# Patient Record
Sex: MALE | Race: WHITE | NOT HISPANIC OR LATINO | Employment: FULL TIME | URBAN - METROPOLITAN AREA
[De-identification: names, ages, dates, MRNs, and addresses within clinical notes are randomized per-mention and may not be internally consistent; named-entity substitution may affect disease eponyms.]

---

## 2017-04-23 DIAGNOSIS — I10 ESSENTIAL HYPERTENSION: ICD-10-CM

## 2017-04-23 RX ORDER — LISINOPRIL AND HYDROCHLOROTHIAZIDE 10; 12.5 MG/1; MG/1
TABLET ORAL
Qty: 90 TABLET | Refills: 0 | Status: SHIPPED | OUTPATIENT
Start: 2017-04-23 | End: 2017-07-10 | Stop reason: SDUPTHER

## 2017-07-10 DIAGNOSIS — I10 ESSENTIAL HYPERTENSION: ICD-10-CM

## 2017-07-10 RX ORDER — LISINOPRIL AND HYDROCHLOROTHIAZIDE 10; 12.5 MG/1; MG/1
TABLET ORAL
Qty: 90 TABLET | Refills: 0 | Status: SHIPPED | OUTPATIENT
Start: 2017-07-10 | End: 2017-09-11 | Stop reason: SDUPTHER

## 2017-09-11 ENCOUNTER — OFFICE VISIT (OUTPATIENT)
Dept: FAMILY MEDICINE | Facility: CLINIC | Age: 37
End: 2017-09-11
Payer: COMMERCIAL

## 2017-09-11 VITALS
DIASTOLIC BLOOD PRESSURE: 88 MMHG | BODY MASS INDEX: 30.29 KG/M2 | SYSTOLIC BLOOD PRESSURE: 138 MMHG | HEIGHT: 67 IN | WEIGHT: 193 LBS | HEART RATE: 90 BPM

## 2017-09-11 DIAGNOSIS — I10 ESSENTIAL HYPERTENSION: Primary | ICD-10-CM

## 2017-09-11 PROCEDURE — 3008F BODY MASS INDEX DOCD: CPT | Mod: ,,, | Performed by: INTERNAL MEDICINE

## 2017-09-11 PROCEDURE — 3077F SYST BP >= 140 MM HG: CPT | Mod: ,,, | Performed by: INTERNAL MEDICINE

## 2017-09-11 PROCEDURE — 3080F DIAST BP >= 90 MM HG: CPT | Mod: ,,, | Performed by: INTERNAL MEDICINE

## 2017-09-11 PROCEDURE — 99203 OFFICE O/P NEW LOW 30 MIN: CPT | Mod: ,,, | Performed by: INTERNAL MEDICINE

## 2017-09-11 RX ORDER — LISINOPRIL AND HYDROCHLOROTHIAZIDE 10; 12.5 MG/1; MG/1
1 TABLET ORAL DAILY
Qty: 90 TABLET | Refills: 1 | Status: SHIPPED | OUTPATIENT
Start: 2017-09-11 | End: 2017-12-13 | Stop reason: SDUPTHER

## 2017-09-11 NOTE — PATIENT INSTRUCTIONS
Taking Your Blood Pressure  Blood pressure is the force of blood against the artery wall as it moves from the heart through the blood vessels. You can take your own blood pressure reading using a digital monitor. Take your readings the same each time, using the same arm. Take readings as often as your healthcare provider instructs.  About blood pressure monitors  Blood pressure monitors are designed for certain ages and cases. You can find monitors for older adults, for pregnant women, and for children. Make sure the one you choose is the right one for your age and situation.  The American Heart Association recommends an automatic cuff monitor that fits on your upper arm (bicep). The cuff should fit your arm size. A cuff thats too large or too small will not give an accurate reading. Measure around your upper arm to find your size.  Monitors that attach to your finger or wrist are not as accurate as monitors for your upper arm.  Ask your healthcare provider for help in choosing a monitor. Bring your monitor to your next provider visit if you need help in using it the correct way.  The steps below are general instructions for using an automatic digital monitor.  Step 1. Relax    · Take your blood pressure at the same time every day, such as in the morning or evening, or at the time your healthcare provider recommends.  · Wait at least a half-hour after smoking, eating, or exercising. Don't drink coffee, tea, soda, or other caffeinated beverages before checking your blood pressure.  · Sit comfortably at a table with both feet on the floor. Do not cross your legs or feet. Place the monitor near you.  · Rest for a few minutes before you begin.  Step 2. Wrap the cuff    · Place your arm on the table, palm up. Your arm should be at the level of your heart. Wrap the cuff around your upper arm, just above your elbow. Its best done on bare skin, not over clothing. Most cuffs will indicate where the brachial artery (the  blood vessel in the middle of the arm at the inner side of the elbow) should line up with the cuff. Look in your monitor's instruction booklet for an illustration. You can also bring your cuff to your healthcare provider and have them show you how to correctly place the cuff.  Step 3. Inflate the cuff    · Push the button that starts the pump.  · The cuff will tighten, then loosen.  · The numbers will change. When they stop changing, your blood pressure reading will appear.  · Take 2 or 3 readings one minute apart.  Step 4. Write down the results of each reading    · Write down your blood pressure numbers for each reading. Note the date and time. Keep your results in one place, such as a notebook. Even if your monitor has a built-in memory, keep a hard copy of the readings.  · Remove the cuff from your arm. Turn off the machine.  · Bring your blood pressure records with your healthcare providers at each visit.  · If you start a new blood pressure medicine, note the day you started the new medicine. Also note the day if you change the dose of your medicine. This information goes on your blood pressure recording sheet. This will help your healthcare provider monitor how well the medicine changes are working.  · Ask your healthcare provider what numbers should prompt you to call him or her. Also ask what numbers should prompt you to get help right away.  Date Last Reviewed: 11/1/2016  © 1322-2670 [employer]. [address] [place] [address], [place] [address]. All rights reserved. This information is not intended as a substitute for professional medical care. Always follow your healthcare professional's instructions.

## 2017-09-11 NOTE — PROGRESS NOTES
Subjective:       Patient ID: Sanchez De La Garza is a 36 y.o. male.    Chief Complaint: Establish Care and Hypertension    This is patient's first visit to establish with me as a primary care physician. He is to follow-up with the [facility] but somehow this seems to be some insurance conflict.    He has hypertension cyst several years. Previously he used to be on lisinopril but secondary to uncontrolled blood pressure it was changed to listen up with hydrochlorothiazide.    Patient also has mild anemia suspected secondary to thalassemia trait.    He does not smoke cigarettes. No illicit drug use. He does drink up to 4 beers on a given weekend.    Exercises modest with some weight and strength.    Sleep is okay. He does get anxious sometimes which is probably related to job and mild baseline general anxiety.    Apparently he was diagnosed with ADD-Patient does not believe that he has ADD. Patient states that he is able to focus at his job. He had a GPA of 3.5 without any medications.      Hypertension   This is a chronic problem. The current episode started more than 1 year ago. The problem has been waxing and waning since onset. Associated symptoms include anxiety. Pertinent negatives include no chest pain, malaise/fatigue, palpitations, peripheral edema, PND or shortness of breath. There are no associated agents to hypertension. Risk factors for coronary artery disease include male gender, obesity and dyslipidemia. Past treatments include ACE inhibitors and diuretics. There is no history of angina, CVA, heart failure, PVD or retinopathy. There is no history of chronic renal disease, coarctation of the aorta or a hypertension causing med.       Past Medical History:   Diagnosis Date    Anxiety     Hypertension      Social History     Social History    Marital status:      Spouse name: N/A    Number of children: N/A    Years of education: N/A     Occupational History    IT support      IT support  "    Social History Main Topics    Smoking status: Former Smoker    Smokeless tobacco: Never Used    Alcohol use Yes    Drug use: No    Sexual activity: Yes     Other Topics Concern    Not on file     Social History Narrative    No narrative on file     History reviewed. No pertinent surgical history.  Family History   Problem Relation Age of Onset    Alcohol abuse Father     Cancer Father        Review of Systems   Constitutional: Negative for activity change, appetite change, fatigue, malaise/fatigue and unexpected weight change.   HENT: Negative for congestion, sneezing and trouble swallowing.    Eyes: Negative for pain and visual disturbance.   Respiratory: Negative for cough, chest tightness and shortness of breath.    Cardiovascular: Negative for chest pain, palpitations, leg swelling and PND.        HTN     Gastrointestinal: Negative for abdominal distention, abdominal pain, blood in stool, constipation and diarrhea.   Endocrine: Negative for cold intolerance, heat intolerance, polydipsia, polyphagia and polyuria.   Genitourinary: Negative for dysuria, hematuria and scrotal swelling.   Musculoskeletal: Negative for arthralgias, back pain and gait problem.   Skin: Negative for pallor, rash and wound.   Allergic/Immunologic: Negative for environmental allergies, food allergies and immunocompromised state.   Neurological: Negative for dizziness, seizures, speech difficulty, light-headedness and numbness.   Hematological: Negative for adenopathy. Does not bruise/bleed easily.        Patient has microcytic anemia which is chronic. He is to assume to have minor thalassemia trait. I do not see any confirmatory tests as yet.   Psychiatric/Behavioral: Negative for agitation, behavioral problems and confusion. The patient is nervous/anxious.        Objective:       Vitals:    09/11/17 1422 09/11/17 1450   BP: (!) 143/93 138/88   Pulse: 90    Weight: 87.5 kg (193 lb)    Height: 5' 7" (1.702 m)      Physical Exam "   Constitutional: He is oriented to person, place, and time. He appears well-developed.   HENT:   Head: Normocephalic and atraumatic.   Nose: Nose normal.   Mouth/Throat: Oropharynx is clear and moist. No oropharyngeal exudate.   Eyes: Conjunctivae and EOM are normal.   Neck: Normal range of motion. Neck supple. No JVD present. No tracheal deviation present. No thyromegaly present.   Cardiovascular: Normal rate, regular rhythm and normal heart sounds.  Exam reveals no gallop and no friction rub.    No murmur heard.  Pulmonary/Chest: Effort normal and breath sounds normal. No respiratory distress. He has no wheezes. He has no rales.   Abdominal: Soft. Bowel sounds are normal. He exhibits no distension. There is no tenderness.   Musculoskeletal: Normal range of motion.   Neurological: He is alert and oriented to person, place, and time. He has normal reflexes.   Skin: Skin is warm and dry.   Psychiatric: He has a normal mood and affect.   Nursing note and vitals reviewed.      Assessment:       1. Essential hypertension         Plan:           Essential hypertension  -     lisinopril-hydrochlorothiazide (PRINZIDE,ZESTORETIC) 10-12.5 mg per tablet; Take 1 tablet by mouth once daily.  Dispense: 90 tablet; Refill: 1    Patient blood pressures have been reviewed and they're borderline normal. His medications will be refilled. He'll be advised to continue to watch his diet and exercise. Cut down on the fried and fatty food and salty food.  More greens and vegetables.    I will like to see him back in about 3 months time and review his blood pressures again. At that time I will check a chemistry and a lipid panel which will be due given that the labs were done 1 year before.    I agree with Patient assumption that he probably does not have ADD. He probably has mild baseline anxiety and have encouraged him to continue with meditation, self realization, yoga and breathing.    I did discuss about alcohol consumption and any  consumption of more than 2 beers on a given day would be a cause of concern especially given hypertension and being on medications. Patient verbalizes understanding.    I set a goal for him to lose 5 pounds of weight before the next visit so that his BMI comes below 30.

## 2017-12-08 LAB
ALBUMIN SERPL-MCNC: 4.9 G/DL (ref 3.5–5.5)
ALBUMIN/CREAT UR: <8.8 MG/G CREAT (ref 0–30)
ALBUMIN/GLOB SERPL: 1.8 {RATIO} (ref 1.2–2.2)
ALP SERPL-CCNC: 52 IU/L (ref 39–117)
ALT SERPL-CCNC: 34 IU/L (ref 0–44)
AST SERPL-CCNC: 23 IU/L (ref 0–40)
BILIRUB SERPL-MCNC: 0.4 MG/DL (ref 0–1.2)
BUN SERPL-MCNC: 15 MG/DL (ref 6–20)
BUN/CREAT SERPL: 17 (ref 9–20)
CALCIUM SERPL-MCNC: 9.4 MG/DL (ref 8.7–10.2)
CHLORIDE SERPL-SCNC: 97 MMOL/L (ref 96–106)
CHOLEST SERPL-MCNC: 172 MG/DL (ref 100–199)
CO2 SERPL-SCNC: 26 MMOL/L (ref 18–29)
CREAT SERPL-MCNC: 0.89 MG/DL (ref 0.76–1.27)
CREAT UR-MCNC: 34 MG/DL
GFR SERPLBLD CREATININE-BSD FMLA CKD-EPI: 112 ML/MIN/1.73
GFR SERPLBLD CREATININE-BSD FMLA CKD-EPI: 129 ML/MIN/1.73
GLOBULIN SER CALC-MCNC: 2.8 G/DL (ref 1.5–4.5)
GLUCOSE SERPL-MCNC: 94 MG/DL (ref 65–99)
HDLC SERPL-MCNC: 45 MG/DL
LDLC SERPL CALC-MCNC: 107 MG/DL (ref 0–99)
MICROALBUMIN UR-MCNC: <3 UG/ML
POTASSIUM SERPL-SCNC: 4.2 MMOL/L (ref 3.5–5.2)
PROT SERPL-MCNC: 7.7 G/DL (ref 6–8.5)
SODIUM SERPL-SCNC: 139 MMOL/L (ref 134–144)
TRIGL SERPL-MCNC: 101 MG/DL (ref 0–149)
VLDLC SERPL CALC-MCNC: 20 MG/DL (ref 5–40)

## 2017-12-13 ENCOUNTER — OFFICE VISIT (OUTPATIENT)
Dept: FAMILY MEDICINE | Facility: CLINIC | Age: 37
End: 2017-12-13
Payer: COMMERCIAL

## 2017-12-13 VITALS
HEIGHT: 67 IN | SYSTOLIC BLOOD PRESSURE: 135 MMHG | HEART RATE: 82 BPM | WEIGHT: 188 LBS | BODY MASS INDEX: 29.51 KG/M2 | DIASTOLIC BLOOD PRESSURE: 84 MMHG

## 2017-12-13 DIAGNOSIS — I10 ESSENTIAL HYPERTENSION: Primary | ICD-10-CM

## 2017-12-13 PROCEDURE — 99213 OFFICE O/P EST LOW 20 MIN: CPT | Mod: ,,, | Performed by: INTERNAL MEDICINE

## 2017-12-13 RX ORDER — LISINOPRIL AND HYDROCHLOROTHIAZIDE 10; 12.5 MG/1; MG/1
1 TABLET ORAL DAILY
Qty: 90 TABLET | Refills: 3 | Status: SHIPPED | OUTPATIENT
Start: 2017-12-13 | End: 2018-06-05 | Stop reason: SDUPTHER

## 2017-12-13 NOTE — PROGRESS NOTES
Subjective:       Patient ID: Sanchez De La Garza is a 36 y.o. male.    Chief Complaint: Hypertension (lab review)    Mr. Sanchez De La Garza is a 36-year-old  male who comes for follow-up. He has underlying hypertension. He is currently taking lisinopril hydrochlorothiazide. He has managed to lose 4-5 pounds of weight after watching his diet and exercise.    He reports no side effects from this medication. He has no chest pains, shortness of breath, cough or expectoration. Patient is nonsmoker. He works for IT industry as a .      Hypertension   This is a chronic problem. The current episode started more than 1 year ago. The problem has been gradually improving since onset. Pertinent negatives include no anxiety, chest pain, malaise/fatigue, palpitations or shortness of breath. Past treatments include ACE inhibitors, diuretics and lifestyle changes. There is no history of a hypertension causing med or pheochromocytoma.       Past Medical History:   Diagnosis Date    Anxiety     Hypertension      Social History     Social History    Marital status:      Spouse name: N/A    Number of children: N/A    Years of education: N/A     Occupational History    IT support      IT support     Social History Main Topics    Smoking status: Former Smoker    Smokeless tobacco: Never Used    Alcohol use Yes    Drug use: No    Sexual activity: Yes     Other Topics Concern    Not on file     Social History Narrative    No narrative on file     History reviewed. No pertinent surgical history.  Family History   Problem Relation Age of Onset    Alcohol abuse Father     Cancer Father        Review of Systems   Constitutional: Negative for activity change, appetite change, fatigue, malaise/fatigue and unexpected weight change.   Eyes: Negative for pain and visual disturbance.   Respiratory: Negative for cough, chest tightness and shortness of breath.    Cardiovascular: Negative for chest pain, palpitations and  "leg swelling.        HTN     Endocrine: Negative for cold intolerance, heat intolerance, polydipsia, polyphagia and polyuria.   Musculoskeletal: Negative for arthralgias, back pain and gait problem.   Skin: Negative for pallor, rash and wound.   Allergic/Immunologic: Negative for environmental allergies, food allergies and immunocompromised state.   Hematological: Negative for adenopathy. Does not bruise/bleed easily.        Patient has microcytic anemia which is chronic. He is to assume to have minor thalassemia trait. I do not see any confirmatory tests as yet.   Psychiatric/Behavioral: Negative for agitation, behavioral problems and confusion. The patient is not nervous/anxious.         Patient is more calm and relaxed as compared to last time.       Objective:       Vitals:    12/13/17 0941   BP: 135/84   Pulse: 82   Weight: 85.3 kg (188 lb)   Height: 5' 7" (1.702 m)     Physical Exam   Constitutional: He is oriented to person, place, and time. He appears well-developed.   HENT:   Head: Normocephalic and atraumatic.   Nose: Nose normal.   Mouth/Throat: Oropharynx is clear and moist. No oropharyngeal exudate.   Eyes: Conjunctivae and EOM are normal.   Neck: Normal range of motion. Neck supple. No JVD present. No tracheal deviation present. No thyromegaly present.   Cardiovascular: Normal rate, regular rhythm and normal heart sounds.  Exam reveals no gallop and no friction rub.    No murmur heard.  Pulmonary/Chest: Effort normal and breath sounds normal. No respiratory distress. He has no wheezes. He has no rales.   Abdominal: Soft. Bowel sounds are normal. He exhibits no distension. There is no tenderness.   Musculoskeletal: Normal range of motion.   Neurological: He is alert and oriented to person, place, and time. He has normal reflexes.   Skin: Skin is warm and dry.   Psychiatric: He has a normal mood and affect.   Nursing note and vitals reviewed.      Assessment:       1. Essential hypertension       "   Glucose 65 - 99 mg/dL 94    BUN, Bld 6 - 20 mg/dL 15    Creatinine 0.76 - 1.27 mg/dL 0.89    eGFR if non African American >59 mL/min/1.73 112    eGFR if African American >59 mL/min/1.73 129    BUN/Creatinine Ratio 9 - 20 17    Sodium 134 - 144 mmol/L 139    Potassium 3.5 - 5.2 mmol/L 4.2    Chloride 96 - 106 mmol/L 97    CO2 18 - 29 mmol/L 26    Calcium 8.7 - 10.2 mg/dL 9.4      Cholesterol 100 - 199 mg/dL 172    Triglycerides 0 - 149 mg/dL 101    HDL >39 mg/dL 45    VLDL Cholesterol Yassine 5 - 40 mg/dL 20    LDL Calculated 0 - 99 mg/dL 107       Creatinine, Random Ur Not Estab. mg/dL 34.0    Microalb, Ur Not Estab. ug/mL <3.0    Comments: **Verified by repeat analysis**   Microalb/Crt. Ratio 0.0 - 30.0 mg/g creat <8.8    Resulting Agency  [facility]         Plan:           Essential hypertension  -     lisinopril-hydrochlorothiazide (PRINZIDE,ZESTORETIC) 10-12.5 mg per tablet; Take 1 tablet by mouth once daily.  Dispense: 90 tablet; Refill: 3    I've reviewed patient's blood pressures at home. Most of the time they seem to be in 130s and 110s. On one occasion it was 142/96 but that was an exception rather than the rule.    Patient's blood pressure medications have been reviewed. His labs have been reviewed including chemistry as well as lipid panel. His LDL cholesterol is 107 which is slightly elevated.    His general chemistry including blood glucose and kidney tests are normal. Urine microalbumin is negative.      Patient has managed to lose weight and the stent watch his diet.      If everything goes on well, I will see patient in approximately 6 months time for annual physical and take it from there.      He will continue to watch his diet and incorporate exercise regimen and try to perhaps lose another 3 or 4 pounds by the next time.

## 2017-12-13 NOTE — PATIENT INSTRUCTIONS
Taking Your Blood Pressure  Blood pressure is the force of blood against the artery wall as it moves from the heart through the blood vessels. You can take your own blood pressure reading using a digital monitor. Take your readings the same each time, using the same arm. Take readings as often as your healthcare provider instructs.  About blood pressure monitors  Blood pressure monitors are designed for certain ages and cases. You can find monitors for older adults, for pregnant women, and for children. Make sure the one you choose is the right one for your age and situation.  The American Heart Association recommends an automatic cuff monitor that fits on your upper arm (bicep). The cuff should fit your arm size. A cuff thats too large or too small will not give an accurate reading. Measure around your upper arm to find your size.  Monitors that attach to your finger or wrist are not as accurate as monitors for your upper arm.  Ask your healthcare provider for help in choosing a monitor. Bring your monitor to your next provider visit if you need help in using it the correct way.  The steps below are general instructions for using an automatic digital monitor.  Step 1. Relax    · Take your blood pressure at the same time every day, such as in the morning or evening, or at the time your healthcare provider recommends.  · Wait at least a half-hour after smoking, eating, or exercising. Don't drink coffee, tea, soda, or other caffeinated beverages before checking your blood pressure.  · Sit comfortably at a table with both feet on the floor. Do not cross your legs or feet. Place the monitor near you.  · Rest for a few minutes before you begin.  Step 2. Wrap the cuff    · Place your arm on the table, palm up. Your arm should be at the level of your heart. Wrap the cuff around your upper arm, just above your elbow. Its best done on bare skin, not over clothing. Most cuffs will indicate where the brachial artery (the  blood vessel in the middle of the arm at the inner side of the elbow) should line up with the cuff. Look in your monitor's instruction booklet for an illustration. You can also bring your cuff to your healthcare provider and have them show you how to correctly place the cuff.  Step 3. Inflate the cuff    · Push the button that starts the pump.  · The cuff will tighten, then loosen.  · The numbers will change. When they stop changing, your blood pressure reading will appear.  · Take 2 or 3 readings one minute apart.  Step 4. Write down the results of each reading    · Write down your blood pressure numbers for each reading. Note the date and time. Keep your results in one place, such as a notebook. Even if your monitor has a built-in memory, keep a hard copy of the readings.  · Remove the cuff from your arm. Turn off the machine.  · Bring your blood pressure records with your healthcare providers at each visit.  · If you start a new blood pressure medicine, note the day you started the new medicine. Also note the day if you change the dose of your medicine. This information goes on your blood pressure recording sheet. This will help your healthcare provider monitor how well the medicine changes are working.  · Ask your healthcare provider what numbers should prompt you to call him or her. Also ask what numbers should prompt you to get help right away.  Date Last Reviewed: 11/1/2016  © 0538-9498 [employer]. [address] [place] [address], [place] [address]. All rights reserved. This information is not intended as a substitute for professional medical care. Always follow your healthcare professional's instructions.

## 2018-06-05 DIAGNOSIS — I10 ESSENTIAL HYPERTENSION: ICD-10-CM

## 2018-06-05 RX ORDER — LISINOPRIL AND HYDROCHLOROTHIAZIDE 10; 12.5 MG/1; MG/1
1 TABLET ORAL DAILY
Qty: 90 TABLET | Refills: 1 | Status: SHIPPED | OUTPATIENT
Start: 2018-06-05 | End: 2019-03-14 | Stop reason: SDUPTHER

## 2018-06-11 ENCOUNTER — OFFICE VISIT (OUTPATIENT)
Dept: FAMILY MEDICINE | Facility: CLINIC | Age: 38
End: 2018-06-11
Payer: COMMERCIAL

## 2018-06-11 VITALS
BODY MASS INDEX: 29.51 KG/M2 | DIASTOLIC BLOOD PRESSURE: 81 MMHG | SYSTOLIC BLOOD PRESSURE: 123 MMHG | HEIGHT: 67 IN | WEIGHT: 188 LBS | HEART RATE: 78 BPM

## 2018-06-11 DIAGNOSIS — Z00.00 ANNUAL PHYSICAL EXAM: Primary | ICD-10-CM

## 2018-06-11 PROCEDURE — 99395 PREV VISIT EST AGE 18-39: CPT | Mod: ,,, | Performed by: INTERNAL MEDICINE

## 2018-06-11 NOTE — PROGRESS NOTES
Subjective:       Patient ID: Sanchez De La Garza is a 37 y.o. male.    Chief Complaint: Annual Exam (well check )    Mr. Sanchez De La Garza is a pleasant 37-year-old  male who comes for annual physical. Thus far his diagnoses include hypertension which is stable on medications. He also has a diagnosis of alpha thalassemia which does not cause any problems.    Patient has a past history of smoking perhaps no more than 2 or 3 packs over 10 years. He quit smoking thereafter. He does not abuse alcohol. No illegal substance abuse.      Jogging and bicycling are formal physical exercises. He also swims in his pool.    Several years ago her hips because of some mild anxiety, he was labeled as having ADD/ADHD though he was never formally prescribed any medications.    At this point and station of his life, he does not exhibit any symptoms suggestive of ADD or ADHD. He is able to focus and complete his tasks and chores. He does get mildly anxious  sometimes but it is totally controllable. He is not under any medications. Sometimes he has to travel and he is worried about jet lag and inability to sleep.  He is happily  and is blessed with 2 children. He works as a independent  and mostly works out of home. He is stable and happy with his job.    He gets regular dental care. He gets regular eye checkup. He is a safe . He does use sunscreen when he goes out in harsh sun.        Past Medical History:   Diagnosis Date    Anemia     Alpha Thallsemia    Anxiety     Mild anxiety    Hypertension      Social History     Social History    Marital status:      Spouse name: N/A    Number of children: 2    Years of education: N/A     Occupational History    IT support Independant      IT support     Social History Main Topics    Smoking status: Former Smoker     Packs/day: 0.10     Types: Cigarettes     Start date: 1/1/2004     Quit date: 5/1/2016    Smokeless tobacco: Never Used    Alcohol use  Yes    Drug use: No    Sexual activity: Yes     Partners: Female     Other Topics Concern    Not on file     Social History Narrative    Nina was raised by His step father. Biological father  at age 52        B - 5 G 3        Average 1-2 cigarettes per day for 10 years     History reviewed. No pertinent surgical history.  Family History   Problem Relation Age of Onset    Alcohol abuse Father     Cancer Father     No Known Problems Sister     Mental illness Brother         Drug and alcohol issues       Review of Systems   Constitutional: Negative for activity change, appetite change, fatigue and unexpected weight change.   HENT: Negative for congestion, drooling, facial swelling and postnasal drip.    Eyes: Negative for pain and visual disturbance.   Respiratory: Negative for cough, chest tightness and shortness of breath.    Cardiovascular: Negative for chest pain, palpitations and leg swelling.        HTN     Gastrointestinal: Negative for abdominal distention, anal bleeding, constipation and diarrhea.   Endocrine: Negative for cold intolerance, heat intolerance, polydipsia, polyphagia and polyuria.   Genitourinary: Negative for difficulty urinating, discharge, flank pain and genital sores.   Musculoskeletal: Negative for arthralgias, back pain and gait problem.   Skin: Negative for pallor, rash and wound.   Allergic/Immunologic: Negative for environmental allergies, food allergies and immunocompromised state.   Neurological: Negative for syncope, light-headedness and headaches.   Hematological: Negative for adenopathy. Does not bruise/bleed easily.        Patient has microcytic anemia which is chronic. He is to assume to have minor thalassemia trait. I do not see any confirmatory tests as yet.   Psychiatric/Behavioral: Negative for agitation, behavioral problems and confusion. The patient is not nervous/anxious.         Patient is more calm and relaxed as compared to last time. Remote history of  "anxiety and suspected ADD       Objective:       Vitals:    06/11/18 1059   BP: 123/81   Pulse: 78   Weight: 85.3 kg (188 lb)   Height: 5' 7" (1.702 m)     Physical Exam   Constitutional: He is oriented to person, place, and time. He appears well-developed.   HENT:   Head: Normocephalic and atraumatic.   Nose: Nose normal.   Mouth/Throat: Oropharynx is clear and moist. No oropharyngeal exudate.   Eyes: Conjunctivae and EOM are normal.   Neck: Normal range of motion. Neck supple. No JVD present. No tracheal deviation present. No thyromegaly present.   Cardiovascular: Normal rate, regular rhythm and normal heart sounds.  Exam reveals no gallop and no friction rub.    No murmur heard.  Pulmonary/Chest: Effort normal and breath sounds normal. No respiratory distress. He has no wheezes. He has no rales.   Abdominal: Soft. Bowel sounds are normal. He exhibits no distension. There is no tenderness. There is no guarding.   Musculoskeletal: Normal range of motion. He exhibits no edema, tenderness or deformity.   Neurological: He is alert and oriented to person, place, and time. He has normal reflexes.   Skin: Skin is warm and dry.   Psychiatric: He has a normal mood and affect.   Nursing note and vitals reviewed.    Lipid panel   Order: [id number]   Status:  Final result   Visible to patient:  Yes (Patient Portal) Next appt:  06/11/2018 at 11:00 AM in Family Medicine (Beltran Munoz MD) Dx:  Essential hypertension     Ref Range & Units 6mo ago 1yr ago 3yr ago    Cholesterol 100 - 199 mg/dL 172  186R, CM  189R, CM     Triglycerides 0 - 149 mg/dL 101  120R, CM  65R, CM     HDL >39 mg/dL 45  52R, CM  59R, CM     VLDL Cholesterol Yassine 5 - 40 mg/dL 20       LDL Calculated 0 - 99 mg/dL 107                Assessment:       1. Annual physical exam         Plan:           Annual physical exam    Patient is generally updated on most preventive care issues. Also mentioned is about pneumonia vaccine which I'll check into. There is a remote " history of smoking.    Advised Mr. De La Garza about age and season appropriate immunizations/ cancer screenings.  Also seasonal influenza vaccine, update on tetanus diphtheria vaccination every 10 years.    Patient's BMI is 29 and have advised him some weight loss. 5-10 pounds.    Follow-up in 6 months to review blood pressure.

## 2018-06-11 NOTE — PATIENT INSTRUCTIONS
Exercise for a Healthier Heart  You may wonder how you can improve the health of your heart. If youre thinking about exercise, youre on the right track. You dont need to become an athlete, but you do need a certain amount of brisk exercise to help strengthen your heart. If you have been diagnosed with a heart condition, your doctor may recommend exercise to help stabilize your condition. To help make exercise a habit, choose safe, fun activities.     Exercise with a friend. When activity is fun, you're more likely to stick with it.     Be sure to check with your healthcare provider before starting an exercise program.   Why exercise?  Exercising regularly offers many healthy rewards. It can help you do all of the following:  · Improve your blood cholesterol level to help prevent further heart trouble  · Lower your blood pressure to help prevent a stroke or heart attack  · Control diabetes, or reduce your risk of getting this disease  · Improve your heart and lung function  · Reach and maintain a healthy weight  · Make your muscles stronger and more limber so you can stay active  · Prevent falls and fractures by slowing the loss of bone mass (osteoporosis)  · Manage stress better  · Reduce your blood pressure  · Improve your sense of self and your body image  Exercise tips  Ease into your routine. Set small goals. Then build on them.  Exercise on most days. Aim for a total of 150 or more minutes of moderate to  vigorous intensity activity each week. Consider 40 minutes, 3 to 4 times a week. For best results, activity should last for 40 minutes on average. It is OK to work up to the 40 minute period over time. Examples of moderate-intensity activity is walking 1 mile in 15 minutes or 30 to 45 minutes of yard work.  Step up your daily activity level. Along with your exercise program, try being more active throughout the day. Walk instead of drive. Do more household tasks or yard work.  Choose one or more  activities you enjoy. Walking is one of the easiest things you can do. You can also try swimming, riding a bike, dancing, or taking an exercise class.  Stop exercising and call your doctor if you:  · Have chest pain or feel dizzy or lightheaded  · Feel burning, tightness, pressure, or heaviness in your chest, neck, shoulders, back, or arms  · Have unusual shortness of breath  · Have increased joint or muscle pain  · Have palpitations or an irregular heartbeat   Date Last Reviewed: 5/1/2016 © 2000-2017 [employer]. [address] [place] [address]. All rights reserved. This information is not intended as a substitute for professional medical care. Always follow your healthcare professional's instructions.        Prevention Guidelines, Men Ages 18 to 39  Screening tests and vaccines are an important part of managing your health. Health counseling is essential, too. Below are guidelines for these, for men ages 18 to 39. Talk with your healthcare provider to make sure youre up-to-date on what you need.  Screening Who needs it How often   Alcohol misuse All men in this age group At routine exams   Blood pressure All men in this age group Every 2 years if your blood pressure is less than 120/80 mm Hg; yearly if your systolic blood pressure is 120 to 139 mm Hg, or your diastolic blood pressure reading is 80 to 89 mm Hg   Depression All men in this age group At routine exams   Diabetes mellitus, type 2 Adults who have no symptoms but are overweight or obese and have 1 or more other risk factors for diabetes At least every 3 years (yearly if blood sugar has already started to rise)   Hepatitis C If at increased risk At routine exams   High cholesterol or triglycerides All men ages 35 and older, and younger men at high risk for coronary artery disease At least every 5 years   HIV All men At routine exams   Obesity All men in this age group At routine exams   Syphilis Men at increased risk for  infection - talk with your healthcare provider At routine exams   Tuberculosis Men at increased risk for infection - talk with your healthcare provider Check with your healthcare provider   Vision All men in this age group Every 5 to 10 years if no risk factors for eye disease   Vaccines Who needs it How often   Chickenpox (varicella) All men in this age group who have no record of this infection or vaccine 2 doses; the second dose should be given at least 4 weeks after the first dose   Hepatitis A Men at increased risk for infection - talk with your healthcare provider 2 doses given at least 6 months apart   Hepatitis B Men at increased risk for infection - talk with your healthcare provider 3 doses over 6 months; second dose should be given 1 month after the first dose; the third dose should be given at least 2 months after the second dose and at least 4 months after the first dose   Haemophilus influenzae Type B (HIB) Men at increased risk for infection - talk with your healthcare provider 1 to 3 doses   Human papillomavirus (HPV) All men in this age group up to age 26 3 doses; the second dose should be given 1 to 2 months after the first dose and the third dose given 6 months after the first dose   Influenza (flu) All men in this age group Once a year   Measles, mumps, rubella (MMR) All men in this age group who have no record of these infections or vaccines 1 or 2 doses through age 55   Meningococcal Men at increased risk for infection - talk with your healthcare provider 1 or more doses   Pneumococca (PCV13) and Pneumococcal (PPSV23) Men at increased risk for infection - talk with your healthcare provider PCV13: 1 dose ages 19 to 65 (protects against 13 types of pneumococcal bacteria)  PPSV23: 1 to 2 doses through age 64, or 1 dose at 65 or older (protects against 23 types of pneumococcal bacteria)   Tetanus/diphtheria/pertussis (Td/Tdap) booster All men in this age group A one-time Tdap booster after age 18,  then Td every10 years   Counseling Who needs it How often   Diet and exercise Overweight or obese people When diagnosed, and then at routine exams   Use of tobacco and the health effects it can cause All men in this age group Every visit   Sexually transmitted infection prevention Men who are sexually active At routine exams   Skin cancer Prevention of skin cancer in fair-skinned adults through age 24 At routine exams   1Those who are 18 years of age, who are not up-to-date on their childhood immunizations, should receive all appropriate catch-up vaccines recommended by the CDC.  Date Last Reviewed: 2/1/2017  © 0329-3542 [employer]. [address] [place] [address], [place] [address]. All rights reserved. This information is not intended as a substitute for professional medical care. Always follow your healthcare professional's instructions.

## 2018-09-10 PROBLEM — Z00.00 ANNUAL PHYSICAL EXAM: Status: RESOLVED | Noted: 2018-06-11 | Resolved: 2018-09-10

## 2019-03-14 DIAGNOSIS — I10 ESSENTIAL HYPERTENSION: ICD-10-CM

## 2019-03-14 RX ORDER — LISINOPRIL AND HYDROCHLOROTHIAZIDE 10; 12.5 MG/1; MG/1
1 TABLET ORAL DAILY
Qty: 90 TABLET | Refills: 0 | Status: SHIPPED | OUTPATIENT
Start: 2019-03-14 | End: 2019-06-07 | Stop reason: SDUPTHER

## 2019-06-07 ENCOUNTER — OFFICE VISIT (OUTPATIENT)
Dept: FAMILY MEDICINE | Facility: CLINIC | Age: 39
End: 2019-06-07
Payer: COMMERCIAL

## 2019-06-07 VITALS
HEART RATE: 83 BPM | HEIGHT: 67 IN | BODY MASS INDEX: 30.29 KG/M2 | DIASTOLIC BLOOD PRESSURE: 80 MMHG | WEIGHT: 193 LBS | SYSTOLIC BLOOD PRESSURE: 130 MMHG

## 2019-06-07 DIAGNOSIS — Z00.00 ANNUAL PHYSICAL EXAM: Primary | ICD-10-CM

## 2019-06-07 DIAGNOSIS — I10 ESSENTIAL HYPERTENSION: Primary | ICD-10-CM

## 2019-06-07 PROCEDURE — 99213 PR OFFICE/OUTPT VISIT, EST, LEVL III, 20-29 MIN: ICD-10-PCS | Mod: ,,, | Performed by: INTERNAL MEDICINE

## 2019-06-07 PROCEDURE — 99213 OFFICE O/P EST LOW 20 MIN: CPT | Mod: ,,, | Performed by: INTERNAL MEDICINE

## 2019-06-07 RX ORDER — LISINOPRIL AND HYDROCHLOROTHIAZIDE 10; 12.5 MG/1; MG/1
1 TABLET ORAL DAILY
Qty: 90 TABLET | Refills: 2 | Status: SHIPPED | OUTPATIENT
Start: 2019-06-07 | End: 2019-08-29 | Stop reason: ALTCHOICE

## 2019-06-07 NOTE — PATIENT INSTRUCTIONS
Taking Your Blood Pressure  Blood pressure is the force of blood against the artery wall as it moves from the heart through the blood vessels. You can take your own blood pressure reading using a digital monitor. Take your readings the same each time, using the same arm. Take readings as often as your healthcare provider instructs.  About blood pressure monitors  Blood pressure monitors are designed for certain ages and cases. You can find monitors for older adults, for pregnant women, and for children. Make sure the one you choose is the right one for your age and situation.  The American Heart Association recommends an automatic cuff monitor that fits on your upper arm (bicep). The cuff should fit your arm size. A cuff thats too large or too small will not give an accurate reading. Measure around your upper arm to find your size.  Monitors that attach to your finger or wrist are not as accurate as monitors for your upper arm.  Ask your healthcare provider for help in choosing a monitor. Bring your monitor to your next provider visit if you need help in using it the correct way.  The steps below are general instructions for using an automatic digital monitor.  Step 1. Relax    · Take your blood pressure at the same time every day, such as in the morning or evening, or at the time your healthcare provider recommends.  · Wait at least a half-hour after smoking, eating, or exercising. Don't drink coffee, tea, soda, or other caffeinated beverages before checking your blood pressure.  · Sit comfortably at a table with both feet on the floor. Do not cross your legs or feet. Place the monitor near you.  · Rest for a few minutes before you begin.  Step 2. Wrap the cuff    · Place your arm on the table, palm up. Your arm should be at the level of your heart. Wrap the cuff around your upper arm, just above your elbow. Its best done on bare skin, not over clothing. Most cuffs will indicate where the brachial artery (the  blood vessel in the middle of the arm at the inner side of the elbow) should line up with the cuff. Look in your monitor's instruction booklet for an illustration. You can also bring your cuff to your healthcare provider and have them show you how to correctly place the cuff.  Step 3. Inflate the cuff    · Push the button that starts the pump.  · The cuff will tighten, then loosen.  · The numbers will change. When they stop changing, your blood pressure reading will appear.  · Take 2 or 3 readings one minute apart.  Step 4. Write down the results of each reading    · Write down your blood pressure numbers for each reading. Note the date and time. Keep your results in one place, such as a notebook. Even if your monitor has a built-in memory, keep a hard copy of the readings.  · Remove the cuff from your arm. Turn off the machine.  · Bring your blood pressure records with your healthcare providers at each visit.  · If you start a new blood pressure medicine, note the day you started the new medicine. Also note the day if you change the dose of your medicine. This information goes on your blood pressure recording sheet. This will help your healthcare provider monitor how well the medicine changes are working.  · Ask your healthcare provider what numbers should prompt you to call him or her. Also ask what numbers should prompt you to get help right away.  Date Last Reviewed: 11/1/2016  © 5647-8384 [employer]. [address] [place] [address], [place] [address]. All rights reserved. This information is not intended as a substitute for professional medical care. Always follow your healthcare professional's instructions.

## 2019-06-07 NOTE — PROGRESS NOTES
Subjective:       Patient ID: Sanchez De La Garza is a 38 y.o. male.    Chief Complaint: Hypertension    Hypertension   This is a chronic problem. The current episode started more than 1 year ago (2016). The problem is unchanged. The problem is controlled. Pertinent negatives include no blurred vision, chest pain, headaches, neck pain, orthopnea or palpitations. Risk factors for coronary artery disease include male gender and obesity. Past treatments include ACE inhibitors and diuretics. The current treatment provides moderate improvement.       Past Medical History:   Diagnosis Date    Anemia     Alpha Thallsemia    Anxiety     Mild anxiety    Hypertension      Social History     Socioeconomic History    Marital status:      Spouse name: Not on file    Number of children: 2    Years of education: Not on file    Highest education level: Not on file   Occupational History    Occupation: IT support Independant     Comment: IT support   Social Needs    Financial resource strain: Not on file    Food insecurity:     Worry: Not on file     Inability: Not on file    Transportation needs:     Medical: Not on file     Non-medical: Not on file   Tobacco Use    Smoking status: Former Smoker     Packs/day: 0.10     Types: Cigarettes     Start date: 1/1/2004     Last attempt to quit: 5/1/2016     Years since quitting: 3.1    Smokeless tobacco: Never Used   Substance and Sexual Activity    Alcohol use: Yes    Drug use: No    Sexual activity: Yes     Partners: Female   Lifestyle    Physical activity:     Days per week: Not on file     Minutes per session: Not on file    Stress: Not at all   Relationships    Social connections:     Talks on phone: Not on file     Gets together: Not on file     Attends Muslim service: Not on file     Active member of club or organization: Not on file     Attends meetings of clubs or organizations: Not on file     Relationship status: Not on file   Other Topics Concern    Not  "on file   Social History Narrative    Nina was raised by His step father. Biological father  at age 52        B - 5 G 3        Average 1-2 cigarettes per day for 10 years     History reviewed. No pertinent surgical history.  Family History   Problem Relation Age of Onset    Alcohol abuse Father     Cancer Father     No Known Problems Sister     Mental illness Brother         Drug and alcohol issues       Review of Systems   Constitutional: Negative for activity change, chills, fatigue, fever and unexpected weight change (gained 5 lbs).   HENT: Negative for congestion, dental problem, hearing loss, rhinorrhea and trouble swallowing.    Eyes: Negative for blurred vision, pain, discharge and visual disturbance.   Respiratory: Negative for cough, choking, chest tightness and wheezing.    Cardiovascular: Negative for chest pain, palpitations, orthopnea and leg swelling.   Gastrointestinal: Negative for blood in stool, constipation, diarrhea and vomiting.   Endocrine: Negative for polydipsia and polyuria.   Genitourinary: Negative for difficulty urinating, hematuria and urgency.   Musculoskeletal: Negative for arthralgias, joint swelling and neck pain.   Skin: Negative for color change and pallor.   Neurological: Negative for dizziness, facial asymmetry, speech difficulty, weakness and headaches.   Psychiatric/Behavioral: Negative for confusion and dysphoric mood.         Objective:      Blood pressure 130/80, pulse 83, height 5' 7" (1.702 m), weight 87.5 kg (193 lb). Body mass index is 30.23 kg/m².  Physical Exam   Constitutional: He is oriented to person, place, and time. He appears well-developed.   BMI-30   HENT:   Head: Normocephalic and atraumatic.   Nose: Nose normal.   Mouth/Throat: No oropharyngeal exudate.   Eyes: Conjunctivae and EOM are normal.   Neck: Normal range of motion. Neck supple. No JVD present. No tracheal deviation present. No thyromegaly present.   Cardiovascular: Normal rate, regular " rhythm and normal heart sounds. Exam reveals no gallop and no friction rub.   No murmur heard.  Pulmonary/Chest: Effort normal and breath sounds normal. No respiratory distress. He has no wheezes. He has no rales.   Abdominal: Soft. Bowel sounds are normal. He exhibits no distension. There is no tenderness. There is no guarding.   Musculoskeletal: He exhibits no edema, tenderness or deformity.   Neurological: He is alert and oriented to person, place, and time. He has normal reflexes.   Skin: Skin is warm and dry.   Psychiatric: He has a normal mood and affect.   Nursing note and vitals reviewed.        Assessment:       1. Essential hypertension           No visits with results within 3 Month(s) from this visit.   Latest known visit with results is:   Office Visit on 09/11/2017   Component Date Value Ref Range Status    Glucose 12/07/2017 94  65 - 99 mg/dL Final    BUN, Bld 12/07/2017 15  6 - 20 mg/dL Final    Creatinine 12/07/2017 0.89  0.76 - 1.27 mg/dL Final    eGFR if non African American 12/07/2017 112  >59 mL/min/1.73 Final    eGFR if  12/07/2017 129  >59 mL/min/1.73 Final    BUN/Creatinine Ratio 12/07/2017 17  9 - 20 Final    Sodium 12/07/2017 139  134 - 144 mmol/L Final    Potassium 12/07/2017 4.2  3.5 - 5.2 mmol/L Final    Chloride 12/07/2017 97  96 - 106 mmol/L Final    CO2 12/07/2017 26  18 - 29 mmol/L Final    Calcium 12/07/2017 9.4  8.7 - 10.2 mg/dL Final    Total Protein 12/07/2017 7.7  6.0 - 8.5 g/dL Final    Albumin 12/07/2017 4.9  3.5 - 5.5 g/dL Final    Globulin, Total 12/07/2017 2.8  1.5 - 4.5 g/dL Final    Albumin/Globulin Ratio 12/07/2017 1.8  1.2 - 2.2 Final    Total Bilirubin 12/07/2017 0.4  0.0 - 1.2 mg/dL Final    Alkaline Phosphatase 12/07/2017 52  39 - 117 IU/L Final    AST 12/07/2017 23  0 - 40 IU/L Final    ALT 12/07/2017 34  0 - 44 IU/L Final    Cholesterol 12/07/2017 172  100 - 199 mg/dL Final    Triglycerides 12/07/2017 101  0 - 149 mg/dL Final     HDL 12/07/2017 45  >39 mg/dL Final    VLDL Cholesterol Yassine 12/07/2017 20  5 - 40 mg/dL Final    LDL Calculated 12/07/2017 107* 0 - 99 mg/dL Final    Creatinine, Random Ur 12/07/2017 34.0  Not Estab. mg/dL Final    Microalb, Ur 12/07/2017 <3.0  Not Estab. ug/mL Final    Microalb/Crt. Ratio 12/07/2017 <8.8  0.0 - 30.0 mg/g creat Final         Plan:           Essential hypertension  -     lisinopril-hydrochlorothiazide (PRINZIDE,ZESTORETIC) 10-12.5 mg per tablet; Take 1 tablet by mouth once daily.  Dispense: 90 tablet; Refill: 2      Advised Mr. De La Garza about age and season appropriate immunizations/ cancer screenings.  Also seasonal influenza vaccine, update on tetanus diphtheria vaccination every 10 years.        Patient has been advised to watch diet and exercise. Avoid fried and fatty food. Compliance to medications and follow up urged.      Labs will be ordered    Fup 6 months annual physical        Current Outpatient Medications:     lisinopril-hydrochlorothiazide (PRINZIDE,ZESTORETIC) 10-12.5 mg per tablet, Take 1 tablet by mouth once daily., Disp: 90 tablet, Rfl: 2

## 2019-07-30 LAB
ALBUMIN SERPL-MCNC: 4.9 G/DL (ref 3.5–5.5)
ALBUMIN/CREAT UR: <10.2 MG/G CREAT (ref 0–30)
ALBUMIN/GLOB SERPL: 1.6 {RATIO} (ref 1.2–2.2)
ALP SERPL-CCNC: 55 IU/L (ref 39–117)
ALT SERPL-CCNC: 42 IU/L (ref 0–44)
AST SERPL-CCNC: 23 IU/L (ref 0–40)
BASOPHILS # BLD AUTO: 0.1 X10E3/UL (ref 0–0.2)
BASOPHILS NFR BLD AUTO: 1 %
BILIRUB SERPL-MCNC: 0.4 MG/DL (ref 0–1.2)
BUN SERPL-MCNC: 22 MG/DL (ref 6–20)
BUN/CREAT SERPL: 21 (ref 9–20)
CALCIUM SERPL-MCNC: 9.8 MG/DL (ref 8.7–10.2)
CHLORIDE SERPL-SCNC: 98 MMOL/L (ref 96–106)
CHOLEST SERPL-MCNC: 226 MG/DL (ref 100–199)
CO2 SERPL-SCNC: 24 MMOL/L (ref 20–29)
CREAT SERPL-MCNC: 1.06 MG/DL (ref 0.76–1.27)
CREAT UR-MCNC: 29.4 MG/DL
EOSINOPHIL # BLD AUTO: 0.2 X10E3/UL (ref 0–0.4)
EOSINOPHIL NFR BLD AUTO: 1 %
ERYTHROCYTE [DISTWIDTH] IN BLOOD BY AUTOMATED COUNT: 17.4 % (ref 12.3–15.4)
GLOBULIN SER CALC-MCNC: 3.1 G/DL (ref 1.5–4.5)
GLUCOSE SERPL-MCNC: 107 MG/DL (ref 65–99)
HCT VFR BLD AUTO: 45.8 % (ref 37.5–51)
HDLC SERPL-MCNC: 54 MG/DL
HGB BLD-MCNC: 14.1 G/DL (ref 13–17.7)
IMM GRANULOCYTES # BLD AUTO: 0 X10E3/UL (ref 0–0.1)
IMM GRANULOCYTES NFR BLD AUTO: 0 %
LDLC SERPL CALC-MCNC: 156 MG/DL (ref 0–99)
LYMPHOCYTES # BLD AUTO: 2.8 X10E3/UL (ref 0.7–3.1)
LYMPHOCYTES NFR BLD AUTO: 26 %
MCH RBC QN AUTO: 20.1 PG (ref 26.6–33)
MCHC RBC AUTO-ENTMCNC: 30.8 G/DL (ref 31.5–35.7)
MCV RBC AUTO: 65 FL (ref 79–97)
MICROALBUMIN UR-MCNC: <3 UG/ML
MONOCYTES # BLD AUTO: 0.6 X10E3/UL (ref 0.1–0.9)
MONOCYTES NFR BLD AUTO: 6 %
NEUTROPHILS # BLD AUTO: 7.3 X10E3/UL (ref 1.4–7)
NEUTROPHILS NFR BLD AUTO: 66 %
PLATELET # BLD AUTO: 330 X10E3/UL (ref 150–450)
POTASSIUM SERPL-SCNC: 5.2 MMOL/L (ref 3.5–5.2)
PROT SERPL-MCNC: 8 G/DL (ref 6–8.5)
RBC # BLD AUTO: 7.02 X10E6/UL (ref 4.14–5.8)
SODIUM SERPL-SCNC: 137 MMOL/L (ref 134–144)
TRIGL SERPL-MCNC: 80 MG/DL (ref 0–149)
VLDLC SERPL CALC-MCNC: 16 MG/DL (ref 5–40)
WBC # BLD AUTO: 10.9 X10E3/UL (ref 3.4–10.8)

## 2019-07-31 ENCOUNTER — PATIENT MESSAGE (OUTPATIENT)
Dept: FAMILY MEDICINE | Facility: CLINIC | Age: 39
End: 2019-07-31

## 2019-08-05 ENCOUNTER — TELEPHONE (OUTPATIENT)
Dept: FAMILY MEDICINE | Facility: CLINIC | Age: 39
End: 2019-08-05

## 2019-08-05 NOTE — TELEPHONE ENCOUNTER
----- Message from Beltran Munoz MD sent at 8/2/2019 12:58 PM CDT -----  Results are somewhat abnormal. Please keep regular follow up.

## 2019-08-29 ENCOUNTER — OFFICE VISIT (OUTPATIENT)
Dept: FAMILY MEDICINE | Facility: CLINIC | Age: 39
End: 2019-08-29
Payer: COMMERCIAL

## 2019-08-29 VITALS
WEIGHT: 193 LBS | DIASTOLIC BLOOD PRESSURE: 85 MMHG | SYSTOLIC BLOOD PRESSURE: 129 MMHG | HEIGHT: 67 IN | BODY MASS INDEX: 30.29 KG/M2 | HEART RATE: 108 BPM

## 2019-08-29 DIAGNOSIS — I10 ESSENTIAL HYPERTENSION: Primary | ICD-10-CM

## 2019-08-29 DIAGNOSIS — E78.2 MIXED HYPERLIPIDEMIA: ICD-10-CM

## 2019-08-29 PROCEDURE — 99213 PR OFFICE/OUTPT VISIT, EST, LEVL III, 20-29 MIN: ICD-10-PCS | Mod: S$GLB,,, | Performed by: INTERNAL MEDICINE

## 2019-08-29 PROCEDURE — 99213 OFFICE O/P EST LOW 20 MIN: CPT | Mod: S$GLB,,, | Performed by: INTERNAL MEDICINE

## 2019-08-29 PROCEDURE — 99999 PR PBB SHADOW E&M-EST. PATIENT-LVL III: ICD-10-PCS | Mod: PBBFAC,,, | Performed by: INTERNAL MEDICINE

## 2019-08-29 PROCEDURE — 99999 PR PBB SHADOW E&M-EST. PATIENT-LVL III: CPT | Mod: PBBFAC,,, | Performed by: INTERNAL MEDICINE

## 2019-08-29 RX ORDER — HYDROCHLOROTHIAZIDE 12.5 MG/1
12.5 TABLET ORAL DAILY
Qty: 90 TABLET | Refills: 1 | Status: SHIPPED | OUTPATIENT
Start: 2019-08-29 | End: 2021-01-29

## 2019-08-29 RX ORDER — LISINOPRIL 10 MG/1
10 TABLET ORAL DAILY
Qty: 90 TABLET | Refills: 1 | Status: SHIPPED | OUTPATIENT
Start: 2019-08-29 | End: 2020-02-25

## 2019-08-29 NOTE — PATIENT INSTRUCTIONS
Taking Your Blood Pressure  Blood pressure is the force of blood against the artery wall as it moves from the heart through the blood vessels. You can take your own blood pressure reading using a digital monitor. Take your readings the same each time, using the same arm. Take readings as often as your healthcare provider instructs.  About blood pressure monitors  Blood pressure monitors are designed for certain ages and cases. You can find monitors for older adults, for pregnant women, and for children. Make sure the one you choose is the right one for your age and situation.  The American Heart Association recommends an automatic cuff monitor that fits on your upper arm (bicep). The cuff should fit your arm size. A cuff thats too large or too small will not give an accurate reading. Measure around your upper arm to find your size.  Monitors that attach to your finger or wrist are not as accurate as monitors for your upper arm.  Ask your healthcare provider for help in choosing a monitor. Bring your monitor to your next provider visit if you need help in using it the correct way.  The steps below are general instructions for using an automatic digital monitor.  Step 1. Relax    · Take your blood pressure at the same time every day, such as in the morning or evening, or at the time your healthcare provider recommends.  · Wait at least a half-hour after smoking, eating, or exercising. Don't drink coffee, tea, soda, or other caffeinated beverages before checking your blood pressure.  · Sit comfortably at a table with both feet on the floor. Do not cross your legs or feet. Place the monitor near you.  · Rest for a few minutes before you begin.  Step 2. Wrap the cuff    · Place your arm on the table, palm up. Your arm should be at the level of your heart. Wrap the cuff around your upper arm, just above your elbow. Its best done on bare skin, not over clothing. Most cuffs will indicate where the brachial artery (the  "blood vessel in the middle of the arm at the inner side of the elbow) should line up with the cuff. Look in your monitor's instruction booklet for an illustration. You can also bring your cuff to your healthcare provider and have them show you how to correctly place the cuff.  Step 3. Inflate the cuff    · Push the button that starts the pump.  · The cuff will tighten, then loosen.  · The numbers will change. When they stop changing, your blood pressure reading will appear.  · Take 2 or 3 readings one minute apart.  Step 4. Write down the results of each reading    · Write down your blood pressure numbers for each reading. Note the date and time. Keep your results in one place, such as a notebook. Even if your monitor has a built-in memory, keep a hard copy of the readings.  · Remove the cuff from your arm. Turn off the machine.  · Bring your blood pressure records with your healthcare providers at each visit.  · If you start a new blood pressure medicine, note the day you started the new medicine. Also note the day if you change the dose of your medicine. This information goes on your blood pressure recording sheet. This will help your healthcare provider monitor how well the medicine changes are working.  · Ask your healthcare provider what numbers should prompt you to call him or her. Also ask what numbers should prompt you to get help right away.  Date Last Reviewed: 11/1/2016 © 2000-2017 [employer]. [address] [place] [id number]. All rights reserved. This information is not intended as a substitute for professional medical care. Always follow your healthcare professional's instructions.        Facts About Dietary Fat     Olive oil is a good source of unsaturated fat.     Eating less saturated and trans fat is one of the best things you can do for your heart. Start by finding out which fats are better to use. Then always try to use as little "bad" fat as you can.  Why eat less " fat?  · Cutting down on the fat you eat can lower your blood cholesterol levels. This may help prevent clogged arteries from buildup of plaque.  · A low-fat diet can help you lose excess weight. Doing so can lower your blood pressure and reduce your chances of getting diabetes.  · A low-fat diet reduces your risk for stroke and for some cancers.  Unsaturated fat is most healthy  · When you must add fat, use unsaturated fat.  · Unsaturated fats come from plants. They include olive, canola, peanut, corn, avocado, safflower, and sunflower oils.  · Liquid (squeezable) margarine is also mostly unsaturated fat.  · In moderate amounts, unsaturated fat can even be good for your heart.  Saturated fat is less healthy  · Avoid eating saturated fat because it raises your blood cholesterol levels.  · Most saturated fat comes from animals. Foods such as butter, lard, cheese, cream, whole milk, and fatty cuts of meat are high in saturated fat.  · Some oils, such as palm and coconut oils, are also saturated fats.  Trans fat is least healthy  · Also avoid trans fat whenever possible. Even if it's not listed on the food label, look for it in the ingredients in the form of hydrogenated or partially hydrogenated oils.  · This is found in snack foods, shortening, french fries, and stick margarines.  Add flavor without fat  · Sprinkle herbs on fish, chicken, and meat, and in soups.  · Try herbs, lemon juice, or flavored vinegar on vegetables.  · Add chopped onions, garlic, and peppers to flavor beans and rice.   Date Last Reviewed: 5/11/2015  © 9479-0092 [employer] [address] [place] [address], [place] [address]. All rights reserved. This information is not intended as a substitute for professional medical care. Always follow your healthcare professional's instructions.

## 2019-08-29 NOTE — PROGRESS NOTES
Subjective:       Patient ID: Sanchez De La Garza is a 38 y.o. male.    Chief Complaint: Results (abnormal labs); Hypertension; and Hyperlipidemia    Mr. Bradford is a 38-year-old  gentleman who comes for follow-up.  He has underlying hypertension and also alpha thalassemia trait.  Recently we had done some labs on him which had shown elevation of total cholesterol and LDL cholesterol levels.  Also the general chemistry had shown a slightly elevated creatinine and BUN.  Blood sugar was also slightly elevated.  Patient was somewhat alarmed and concerned about this.    Since his last visit he has gained about some 5 lb of weight.  He states that he is trying to watch his diet and exercise and maintain his weight.  He does like to play his golf and sometimes he is exposed to excessive and harsh heat and some.  It is possible that he might have been dehydrated on those locations.  He is currently taking lisinopril hydrochlorothiazide and would like to split the medications separately so that he can take the diuretic part only as needed and on not on a day when he is exposed to harsh some light or heat and he might get more dehydrated.    Otherwise he is doing okay with his job and family.  He does not smoke cigarettes.  He does not drink excessively.  He does tend to get somewhat worried about his health conditions.    His father had  at an early age because of some tumor or cancer in the lower extremity and I am not sure if it was a connective tissue tumor or a skin cancer.    Hypertension   This is a chronic problem. The current episode started more than 1 year ago. The problem has been waxing and waning since onset. The problem is controlled. Associated symptoms include anxiety. Pertinent negatives include no chest pain, headaches, neck pain or palpitations. Risk factors for coronary artery disease include male gender and dyslipidemia. Past treatments include ACE inhibitors and diuretics. The current treatment  provides moderate improvement. There is no history of pheochromocytoma or renovascular disease.       Past Medical History:   Diagnosis Date    Anemia     Alpha Thallsemia    Anxiety     Mild anxiety    Hypertension      Social History     Socioeconomic History    Marital status:      Spouse name: Not on file    Number of children: 2    Years of education: Not on file    Highest education level: Not on file   Occupational History    Occupation: IT support Independant     Comment: IT support   Social Needs    Financial resource strain: Not hard at all    Food insecurity:     Worry: Never true     Inability: Never true    Transportation needs:     Medical: No     Non-medical: No   Tobacco Use    Smoking status: Former Smoker     Packs/day: 0.10     Types: Cigarettes     Start date: 2004     Last attempt to quit: 2016     Years since quitting: 3.3    Smokeless tobacco: Never Used   Substance and Sexual Activity    Alcohol use: Yes     Frequency: 2-4 times a month     Drinks per session: 3 or 4     Binge frequency: Less than monthly    Drug use: No    Sexual activity: Yes     Partners: Female   Lifestyle    Physical activity:     Days per week: 4 days     Minutes per session: 40 min    Stress: To some extent   Relationships    Social connections:     Talks on phone: Once a week     Gets together: Once a week     Attends Evangelical service: Not on file     Active member of club or organization: No     Attends meetings of clubs or organizations: Patient refused     Relationship status:    Other Topics Concern    Not on file   Social History Narrative    Nina was raised by His step father. Biological father  at age 52        B - 5 G 3        Average 1-2 cigarettes per day for 10 years     History reviewed. No pertinent surgical history.  Family History   Problem Relation Age of Onset    Alcohol abuse Father     Cancer Father         Cancer in foot - ? Soft tissue or Skin  "   Hyperlipidemia Mother     Hypertension Mother     No Known Problems Sister     Mental illness Brother         Drug and alcohol issues       Review of Systems   Constitutional: Negative for activity change and unexpected weight change (No change since last visit).   HENT: Negative for hearing loss, rhinorrhea and trouble swallowing.    Eyes: Negative for discharge and visual disturbance.   Respiratory: Negative for chest tightness and wheezing.    Cardiovascular: Negative for chest pain and palpitations.   Gastrointestinal: Negative for blood in stool, constipation, diarrhea and vomiting.   Endocrine: Negative for polydipsia and polyuria.   Genitourinary: Negative for difficulty urinating, hematuria and urgency.   Musculoskeletal: Negative for arthralgias, joint swelling and neck pain.   Neurological: Negative for weakness and headaches.   Hematological:        Alpha thallasemia   Psychiatric/Behavioral: Negative for confusion and dysphoric mood.         Objective:      Blood pressure 129/85, pulse 108, height 5' 7" (1.702 m), weight 87.5 kg (193 lb). Body mass index is 30.23 kg/m².  Physical Exam   Constitutional: He is oriented to person, place, and time. He appears well-developed.   BMI-30   HENT:   Head: Normocephalic and atraumatic.   Nose: Nose normal.   Mouth/Throat: No oropharyngeal exudate.   Eyes: Conjunctivae and EOM are normal.   Neck: Normal range of motion. Neck supple. No JVD present. No tracheal deviation present. No thyromegaly present.   Cardiovascular: Normal rate, regular rhythm and normal heart sounds. Exam reveals no gallop and no friction rub.   No murmur heard.  Pulmonary/Chest: Effort normal and breath sounds normal. No respiratory distress. He has no wheezes. He has no rales.   Abdominal: Soft. Bowel sounds are normal. He exhibits no distension. There is no tenderness. There is no guarding.   Musculoskeletal: He exhibits no edema, tenderness or deformity.   Neurological: He is alert " and oriented to person, place, and time. He has normal reflexes.   Skin: Skin is warm and dry.   Psychiatric: His mood appears anxious.   Nursing note and vitals reviewed.        Assessment:       1. Essential hypertension    2. Mixed hyperlipidemia           Orders Only on 06/07/2019   Component Date Value Ref Range Status    WBC 07/29/2019 10.9* 3.4 - 10.8 x10E3/uL Final    RBC 07/29/2019 7.02* 4.14 - 5.80 x10E6/uL Final    Hemoglobin 07/29/2019 14.1  13.0 - 17.7 g/dL Final    Hematocrit 07/29/2019 45.8  37.5 - 51.0 % Final    Mean Corpuscular Volume 07/29/2019 65* 79 - 97 fL Final    Mean Corpuscular Hemoglobin 07/29/2019 20.1* 26.6 - 33.0 pg Final    Mean Corpuscular Hemoglobin Conc 07/29/2019 30.8* 31.5 - 35.7 g/dL Final    RDW 07/29/2019 17.4* 12.3 - 15.4 % Final    Platelets 07/29/2019 330  150 - 450 x10E3/uL Final    Neutrophils 07/29/2019 66  Not Estab. % Final    Lymph% 07/29/2019 26  Not Estab. % Final    Mono% 07/29/2019 6  Not Estab. % Final    Eosinophil% 07/29/2019 1  Not Estab. % Final    Basophil% 07/29/2019 1  Not Estab. % Final    Neutrophils Absolute 07/29/2019 7.3* 1.4 - 7.0 x10E3/uL Final    Lymph # 07/29/2019 2.8  0.7 - 3.1 x10E3/uL Final    Mono # 07/29/2019 0.6  0.1 - 0.9 x10E3/uL Final    Eos # 07/29/2019 0.2  0.0 - 0.4 x10E3/uL Final    Baso # 07/29/2019 0.1  0.0 - 0.2 x10E3/uL Final    Immature Granulocytes 07/29/2019 0  Not Estab. % Final    Immature Grans (Abs) 07/29/2019 0.0  0.0 - 0.1 x10E3/uL Final    Creatinine, Random Ur 07/29/2019 29.4  Not Estab. mg/dL Final    Microalb, Ur 07/29/2019 <3.0  Not Estab. ug/mL Final    Microalb/Crt. Ratio 07/29/2019 <10.2  0.0 - 30.0 mg/g creat Final    Glucose 07/29/2019 107* 65 - 99 mg/dL Final    BUN, Bld 07/29/2019 22* 6 - 20 mg/dL Final    Creatinine 07/29/2019 1.06  0.76 - 1.27 mg/dL Final    eGFR if non African American 07/29/2019 90  >59 mL/min/1.73 Final    eGFR if African American 07/29/2019 104  >59  mL/min/1.73 Final    BUN/Creatinine Ratio 07/29/2019 21* 9 - 20 Final    Sodium 07/29/2019 137  134 - 144 mmol/L Final    Potassium 07/29/2019 5.2  3.5 - 5.2 mmol/L Final    Chloride 07/29/2019 98  96 - 106 mmol/L Final    CO2 07/29/2019 24  20 - 29 mmol/L Final    Calcium 07/29/2019 9.8  8.7 - 10.2 mg/dL Final    Total Protein 07/29/2019 8.0  6.0 - 8.5 g/dL Final    Albumin 07/29/2019 4.9  3.5 - 5.5 g/dL Final    Globulin, Total 07/29/2019 3.1  1.5 - 4.5 g/dL Final    Albumin/Globulin Ratio 07/29/2019 1.6  1.2 - 2.2 Final    Total Bilirubin 07/29/2019 0.4  0.0 - 1.2 mg/dL Final    Alkaline Phosphatase 07/29/2019 55  39 - 117 IU/L Final    AST 07/29/2019 23  0 - 40 IU/L Final    ALT 07/29/2019 42  0 - 44 IU/L Final    Cholesterol 07/29/2019 226* 100 - 199 mg/dL Final    Triglycerides 07/29/2019 80  0 - 149 mg/dL Final    HDL 07/29/2019 54  >39 mg/dL Final    VLDL Cholesterol Yassine 07/29/2019 16  5 - 40 mg/dL Final    LDL Calculated 07/29/2019 156* 0 - 99 mg/dL Final         Plan:           Essential hypertension  -     lisinopril 10 MG tablet; Take 1 tablet (10 mg total) by mouth once daily.  Dispense: 90 tablet; Refill: 1  -     hydroCHLOROthiazide (HYDRODIURIL) 12.5 MG Tab; Take 1 tablet (12.5 mg total) by mouth once daily.  Dispense: 90 tablet; Refill: 1  -     Basic metabolic panel; Future; Expected date: 11/22/2019    Mixed hyperlipidemia  -     Lipid panel; Future; Expected date: 11/22/2019      At this point I will separate the lisinopril from hydrochlorothiazide and will give separate prescriptions for the same.  Mostly he will continue lisinopril and he will take hydrochlorothiazide on the occasions he feels he has some swelling or somewhat more elevated blood pressure.    He does plan to watch his diet hence forth and the goal is for him to lose approximately 5 lb of weight before his follow-up before Thanksgiving.    I would like to check a lipid panel again at that point and a basic  chemistry to be sure that his BUN creatinine is okay.    Blood pressures at arrival was somewhat elevated and subsequently seemed to settle down.    Blood counts have been reviewed and they indicate lower MCV with a history of alpha-thalassemia trait.    I will see him in a couple of months time to review his blood pressures again.      Current Outpatient Medications:     hydroCHLOROthiazide (HYDRODIURIL) 12.5 MG Tab, Take 1 tablet (12.5 mg total) by mouth once daily., Disp: 90 tablet, Rfl: 1    lisinopril 10 MG tablet, Take 1 tablet (10 mg total) by mouth once daily., Disp: 90 tablet, Rfl: 1

## 2019-11-23 LAB
BUN SERPL-MCNC: 13 MG/DL (ref 6–20)
BUN/CREAT SERPL: 13 (ref 9–20)
CALCIUM SERPL-MCNC: 9.6 MG/DL (ref 8.7–10.2)
CHLORIDE SERPL-SCNC: 99 MMOL/L (ref 96–106)
CHOLEST SERPL-MCNC: 176 MG/DL (ref 100–199)
CO2 SERPL-SCNC: 21 MMOL/L (ref 20–29)
CREAT SERPL-MCNC: 1.04 MG/DL (ref 0.76–1.27)
GLUCOSE SERPL-MCNC: NORMAL MG/DL
HDLC SERPL-MCNC: 45 MG/DL
LDLC SERPL CALC-MCNC: 114 MG/DL (ref 0–99)
POTASSIUM SERPL-SCNC: NORMAL MMOL/L
SODIUM SERPL-SCNC: 139 MMOL/L (ref 134–144)
TRIGL SERPL-MCNC: 84 MG/DL (ref 0–149)
VLDLC SERPL CALC-MCNC: 17 MG/DL (ref 5–40)

## 2019-11-23 NOTE — PROGRESS NOTES
Please review your labs prior to follow-up.  Cholesterol panel is much better as compared to last time.  General chemistry is okay.  For some reason blood glucose and potassium tests have been canceled.  I will check with my staff to verify why they were canceled.  Please keep your scheduled follow-up.

## 2019-11-25 ENCOUNTER — TELEPHONE (OUTPATIENT)
Dept: FAMILY MEDICINE | Facility: CLINIC | Age: 39
End: 2019-11-25

## 2019-11-25 NOTE — TELEPHONE ENCOUNTER
----- Message from Beltran Munoz MD sent at 11/23/2019  7:59 AM CST -----  Please review your labs prior to follow-up.  Cholesterol panel is much better as compared to last time.  General chemistry is okay.  For some reason blood glucose and potassium tests have been canceled.  I will check with my staff to verify why they were canceled.  Please keep your scheduled follow-up.

## 2019-12-13 DIAGNOSIS — I10 ESSENTIAL HYPERTENSION: Primary | ICD-10-CM

## 2019-12-13 NOTE — PROGRESS NOTES
Tests for potassium and glucose ordered again.  Somehow they were not done by the lab.  I do not believe that the patient or insurance should be billed again for missing labs.

## 2019-12-13 NOTE — Clinical Note
I have ordered labs for potassium and glucose which were missing from the previous lab.  Please see my message in details.

## 2019-12-16 ENCOUNTER — TELEPHONE (OUTPATIENT)
Dept: FAMILY MEDICINE | Facility: CLINIC | Age: 39
End: 2019-12-16

## 2019-12-16 NOTE — TELEPHONE ENCOUNTER
----- Message from Beltran Munoz MD sent at 12/13/2019 11:11 AM CST -----  I have ordered labs for potassium and glucose which were missing from the previous lab.  Please see my message in details.

## 2020-02-25 DIAGNOSIS — I10 ESSENTIAL HYPERTENSION: ICD-10-CM

## 2020-02-25 RX ORDER — LISINOPRIL 10 MG/1
TABLET ORAL
Qty: 90 TABLET | Refills: 0 | Status: SHIPPED | OUTPATIENT
Start: 2020-02-25 | End: 2020-05-22

## 2020-03-20 ENCOUNTER — PATIENT MESSAGE (OUTPATIENT)
Dept: FAMILY MEDICINE | Facility: CLINIC | Age: 40
End: 2020-03-20

## 2020-03-20 ENCOUNTER — NURSE TRIAGE (OUTPATIENT)
Dept: ADMINISTRATIVE | Facility: CLINIC | Age: 40
End: 2020-03-20

## 2020-03-20 NOTE — TELEPHONE ENCOUNTER
Patient received direct message during call from PCP that has answered his question.  No triage needed.    Reason for Disposition   Caller has already spoken with the PCP (or office), and has no further questions    Protocols used: NO CONTACT OR DUPLICATE CONTACT CALL-A-[place]

## 2020-05-22 DIAGNOSIS — I10 ESSENTIAL HYPERTENSION: ICD-10-CM

## 2020-05-22 RX ORDER — LISINOPRIL 10 MG/1
TABLET ORAL
Qty: 90 TABLET | Refills: 0 | Status: SHIPPED | OUTPATIENT
Start: 2020-05-22 | End: 2020-08-21

## 2020-12-28 ENCOUNTER — LAB VISIT (OUTPATIENT)
Dept: PRIMARY CARE CLINIC | Facility: OTHER | Age: 40
End: 2020-12-28
Attending: INTERNAL MEDICINE
Payer: COMMERCIAL

## 2020-12-28 DIAGNOSIS — Z03.818 ENCOUNTER FOR OBSERVATION FOR SUSPECTED EXPOSURE TO OTHER BIOLOGICAL AGENTS RULED OUT: ICD-10-CM

## 2020-12-28 PROCEDURE — U0003 INFECTIOUS AGENT DETECTION BY NUCLEIC ACID (DNA OR RNA); SEVERE ACUTE RESPIRATORY SYNDROME CORONAVIRUS 2 (SARS-COV-2) (CORONAVIRUS DISEASE [COVID-19]), AMPLIFIED PROBE TECHNIQUE, MAKING USE OF HIGH THROUGHPUT TECHNOLOGIES AS DESCRIBED BY CMS-2020-01-R: HCPCS

## 2020-12-30 LAB — SARS-COV-2 RNA RESP QL NAA+PROBE: NOT DETECTED

## 2021-01-28 ENCOUNTER — PATIENT MESSAGE (OUTPATIENT)
Dept: FAMILY MEDICINE | Facility: CLINIC | Age: 41
End: 2021-01-28

## 2021-01-29 ENCOUNTER — OFFICE VISIT (OUTPATIENT)
Dept: FAMILY MEDICINE | Facility: CLINIC | Age: 41
End: 2021-01-29
Payer: COMMERCIAL

## 2021-01-29 VITALS
BODY MASS INDEX: 31.08 KG/M2 | DIASTOLIC BLOOD PRESSURE: 87 MMHG | WEIGHT: 198 LBS | TEMPERATURE: 97 F | SYSTOLIC BLOOD PRESSURE: 130 MMHG | HEIGHT: 67 IN | HEART RATE: 96 BPM

## 2021-01-29 DIAGNOSIS — L73.9 FOLLICULITIS: ICD-10-CM

## 2021-01-29 DIAGNOSIS — R51.9 SCALP PAIN: Primary | ICD-10-CM

## 2021-01-29 PROCEDURE — 99212 PR OFFICE/OUTPT VISIT, EST, LEVL II, 10-19 MIN: ICD-10-PCS | Mod: S$GLB,,, | Performed by: INTERNAL MEDICINE

## 2021-01-29 PROCEDURE — 99212 OFFICE O/P EST SF 10 MIN: CPT | Mod: S$GLB,,, | Performed by: INTERNAL MEDICINE

## 2021-01-29 RX ORDER — MUPIROCIN 20 MG/G
OINTMENT TOPICAL 3 TIMES DAILY
Qty: 15 G | Refills: 0 | Status: SHIPPED | OUTPATIENT
Start: 2021-01-29 | End: 2021-03-25

## 2021-03-25 ENCOUNTER — OFFICE VISIT (OUTPATIENT)
Dept: FAMILY MEDICINE | Facility: CLINIC | Age: 41
End: 2021-03-25
Payer: COMMERCIAL

## 2021-03-25 ENCOUNTER — PATIENT MESSAGE (OUTPATIENT)
Dept: FAMILY MEDICINE | Facility: CLINIC | Age: 41
End: 2021-03-25

## 2021-03-25 VITALS
HEART RATE: 91 BPM | DIASTOLIC BLOOD PRESSURE: 74 MMHG | BODY MASS INDEX: 29.51 KG/M2 | HEIGHT: 67 IN | WEIGHT: 188 LBS | SYSTOLIC BLOOD PRESSURE: 127 MMHG

## 2021-03-25 DIAGNOSIS — I10 ESSENTIAL HYPERTENSION: ICD-10-CM

## 2021-03-25 DIAGNOSIS — Z11.59 NEED FOR HEPATITIS C SCREENING TEST: ICD-10-CM

## 2021-03-25 DIAGNOSIS — Z00.00 ANNUAL PHYSICAL EXAM: Primary | Chronic | ICD-10-CM

## 2021-03-25 PROCEDURE — 99395 PR PREVENTIVE VISIT,EST,18-39: ICD-10-PCS | Mod: S$GLB,,, | Performed by: INTERNAL MEDICINE

## 2021-03-25 PROCEDURE — 99395 PREV VISIT EST AGE 18-39: CPT | Mod: S$GLB,,, | Performed by: INTERNAL MEDICINE

## 2021-03-25 RX ORDER — LISINOPRIL 10 MG/1
10 TABLET ORAL DAILY
Qty: 90 TABLET | Refills: 2 | Status: SHIPPED | OUTPATIENT
Start: 2021-03-25 | End: 2022-04-04

## 2021-03-27 LAB
ALBUMIN SERPL-MCNC: 4.7 G/DL (ref 4–5)
ALBUMIN/GLOB SERPL: 1.7 {RATIO} (ref 1.2–2.2)
ALP SERPL-CCNC: 57 IU/L (ref 39–117)
ALT SERPL-CCNC: 28 IU/L (ref 0–44)
APPEARANCE UR: CLEAR
AST SERPL-CCNC: 20 IU/L (ref 0–40)
BASOPHILS # BLD AUTO: 0 X10E3/UL (ref 0–0.2)
BASOPHILS NFR BLD AUTO: 1 %
BILIRUB SERPL-MCNC: 0.5 MG/DL (ref 0–1.2)
BILIRUB UR QL STRIP: NEGATIVE
BUN SERPL-MCNC: 13 MG/DL (ref 6–20)
BUN/CREAT SERPL: 15 (ref 9–20)
CALCIUM SERPL-MCNC: 9.6 MG/DL (ref 8.7–10.2)
CHLORIDE SERPL-SCNC: 100 MMOL/L (ref 96–106)
CHOLEST SERPL-MCNC: 167 MG/DL (ref 100–199)
CO2 SERPL-SCNC: 25 MMOL/L (ref 20–29)
COLOR UR: YELLOW
CREAT SERPL-MCNC: 0.88 MG/DL (ref 0.76–1.27)
EOSINOPHIL # BLD AUTO: 0.1 X10E3/UL (ref 0–0.4)
EOSINOPHIL NFR BLD AUTO: 1 %
ERYTHROCYTE [DISTWIDTH] IN BLOOD BY AUTOMATED COUNT: 18.5 % (ref 11.6–15.4)
GLOBULIN SER CALC-MCNC: 2.8 G/DL (ref 1.5–4.5)
GLUCOSE SERPL-MCNC: 100 MG/DL (ref 65–99)
GLUCOSE UR QL: NEGATIVE
HCT VFR BLD AUTO: 44.5 % (ref 37.5–51)
HCV AB S/CO SERPL IA: <0.1 S/CO RATIO (ref 0–0.9)
HDLC SERPL-MCNC: 40 MG/DL
HGB BLD-MCNC: 12.9 G/DL (ref 13–17.7)
HGB UR QL STRIP: NEGATIVE
IMM GRANULOCYTES # BLD AUTO: 0 X10E3/UL (ref 0–0.1)
IMM GRANULOCYTES NFR BLD AUTO: 0 %
KETONES UR QL STRIP: NEGATIVE
LDLC SERPL CALC-MCNC: 111 MG/DL (ref 0–99)
LEUKOCYTE ESTERASE UR QL STRIP: NEGATIVE
LYMPHOCYTES # BLD AUTO: 2.8 X10E3/UL (ref 0.7–3.1)
LYMPHOCYTES NFR BLD AUTO: 43 %
MCH RBC QN AUTO: 18.7 PG (ref 26.6–33)
MCHC RBC AUTO-ENTMCNC: 29 G/DL (ref 31.5–35.7)
MCV RBC AUTO: 65 FL (ref 79–97)
MICRO URNS: ABNORMAL
MONOCYTES # BLD AUTO: 0.6 X10E3/UL (ref 0.1–0.9)
MONOCYTES NFR BLD AUTO: 10 %
NEUTROPHILS # BLD AUTO: 3 X10E3/UL (ref 1.4–7)
NEUTROPHILS NFR BLD AUTO: 45 %
NITRITE UR QL STRIP: NEGATIVE
PH UR STRIP: 6.5 [PH] (ref 5–7.5)
PLATELET # BLD AUTO: 368 X10E3/UL (ref 150–450)
POTASSIUM SERPL-SCNC: 4.7 MMOL/L (ref 3.5–5.2)
PROT SERPL-MCNC: 7.5 G/DL (ref 6–8.5)
PROT UR QL STRIP: NEGATIVE
RBC # BLD AUTO: 6.89 X10E6/UL (ref 4.14–5.8)
SODIUM SERPL-SCNC: 137 MMOL/L (ref 134–144)
SP GR UR: <=1.005 (ref 1–1.03)
TRIGL SERPL-MCNC: 86 MG/DL (ref 0–149)
UROBILINOGEN UR STRIP-MCNC: 0.2 MG/DL (ref 0.2–1)
VLDLC SERPL CALC-MCNC: 16 MG/DL (ref 5–40)
WBC # BLD AUTO: 6.7 X10E3/UL (ref 3.4–10.8)

## 2021-04-29 ENCOUNTER — PATIENT MESSAGE (OUTPATIENT)
Dept: RESEARCH | Facility: HOSPITAL | Age: 41
End: 2021-04-29

## 2021-08-13 ENCOUNTER — PATIENT MESSAGE (OUTPATIENT)
Dept: FAMILY MEDICINE | Facility: CLINIC | Age: 41
End: 2021-08-13

## 2021-10-07 ENCOUNTER — LAB VISIT (OUTPATIENT)
Dept: PRIMARY CARE CLINIC | Facility: OTHER | Age: 41
End: 2021-10-07
Payer: COMMERCIAL

## 2021-10-07 DIAGNOSIS — Z20.822 ENCOUNTER FOR LABORATORY TESTING FOR COVID-19 VIRUS: ICD-10-CM

## 2021-10-07 PROCEDURE — U0003 INFECTIOUS AGENT DETECTION BY NUCLEIC ACID (DNA OR RNA); SEVERE ACUTE RESPIRATORY SYNDROME CORONAVIRUS 2 (SARS-COV-2) (CORONAVIRUS DISEASE [COVID-19]), AMPLIFIED PROBE TECHNIQUE, MAKING USE OF HIGH THROUGHPUT TECHNOLOGIES AS DESCRIBED BY CMS-2020-01-R: HCPCS

## 2021-10-08 LAB
SARS-COV-2 RNA RESP QL NAA+PROBE: NOT DETECTED
SARS-COV-2- CYCLE NUMBER: NORMAL

## 2022-01-06 ENCOUNTER — OFFICE VISIT (OUTPATIENT)
Dept: ORTHOPEDICS | Facility: CLINIC | Age: 42
End: 2022-01-06
Payer: COMMERCIAL

## 2022-01-06 VITALS — WEIGHT: 188 LBS | HEIGHT: 67 IN | BODY MASS INDEX: 29.51 KG/M2

## 2022-01-06 DIAGNOSIS — S82.445A CLOSED NONDISPLACED SPIRAL FRACTURE OF SHAFT OF LEFT FIBULA, INITIAL ENCOUNTER: Primary | ICD-10-CM

## 2022-01-06 PROCEDURE — 27786 PR CLOSED RX DIST FIBULA FX: ICD-10-PCS | Mod: LT,S$GLB,, | Performed by: ORTHOPAEDIC SURGERY

## 2022-01-06 PROCEDURE — 99214 OFFICE O/P EST MOD 30 MIN: CPT | Mod: 57,S$GLB,, | Performed by: ORTHOPAEDIC SURGERY

## 2022-01-06 PROCEDURE — 27786 TREATMENT OF ANKLE FRACTURE: CPT | Mod: LT,S$GLB,, | Performed by: ORTHOPAEDIC SURGERY

## 2022-01-06 PROCEDURE — 99214 PR OFFICE/OUTPT VISIT, EST, LEVL IV, 30-39 MIN: ICD-10-PCS | Mod: 57,S$GLB,, | Performed by: ORTHOPAEDIC SURGERY

## 2022-01-06 RX ORDER — OXYCODONE HYDROCHLORIDE 5 MG/1
TABLET ORAL
COMMUNITY
Start: 2022-01-01 | End: 2022-07-14 | Stop reason: ALTCHOICE

## 2022-01-06 NOTE — PROGRESS NOTES
[facility]    Subjective:     Chief Complaint:   Chief Complaint   Patient presents with    Left Lower Leg - Injury     Left fibula spiral fx        Past Medical History:   Diagnosis Date    ADD (attention deficit disorder)     Anemia     Alpha Thallsemia    Anxiety     Mild anxiety    Hypertension        No past surgical history on file.    Current Outpatient Medications   Medication Sig    lisinopriL 10 MG tablet Take 1 tablet (10 mg total) by mouth once daily.    oxyCODONE (ROXICODONE) 5 MG immediate release tablet      No current facility-administered medications for this visit.       Review of patient's allergies indicates:  No Known Allergies    Family History   Problem Relation Age of Onset    Alcohol abuse Father     Cancer Father         Cancer in foot - ? Soft tissue or Skin    Hyperlipidemia Mother     Hypertension Mother     Blindness Sister     Mental illness Brother         Drug and alcohol issues    Psychosis Brother        Social History     Socioeconomic History    Marital status:      Spouse name: Melvi De La Garza    Number of children: 2   Occupational History    Occupation: IT support Independant     Comment: IT support/[employer]   Tobacco Use    Smoking status: Former Smoker     Packs/day: 0.10     Types: Cigarettes     Start date: 2004     Quit date: 2016     Years since quittin.6    Smokeless tobacco: Never Used   Substance and Sexual Activity    Alcohol use: Yes    Drug use: No    Sexual activity: Yes     Partners: Female   Social History Narrative    Nina was raised by His step father. Biological father  at age 52        B - 5 G 3        Average 1-2 cigarettes per day for 10 years     Social Determinants of Health     Financial Resource Strain: Low Risk     Difficulty of Paying Living Expenses: Not very hard   Food Insecurity: No Food Insecurity    Worried About Running Out of Food in the Last Year: Never true    Ran Out of  Food in the Last Year: Never true   Transportation Needs: No Transportation Needs    Lack of Transportation (Medical): No    Lack of Transportation (Non-Medical): No   Physical Activity: Sufficiently Active    Days of Exercise per Week: 5 days    Minutes of Exercise per Session: 30 min   Stress: Stress Concern Present    Feeling of Stress : To some extent   Social Connections: Unknown    Frequency of Communication with Friends and Family: Once a week    Frequency of Social Gatherings with Friends and Family: Once a week    Active Member of Clubs or Organizations: No    Attends Club or Organization Meetings: Never    Marital Status:    Housing Stability: Low Risk     Unable to Pay for Housing in the Last Year: No    Number of Places Lived in the Last Year: 1    Unstable Housing in the Last Year: No       History of present illness:  Patient comes in today for the left ankle.  He fell in [place] while walking on ice.  He was seen in the emergency room and referred on for further care      Review of Systems:    Constitution: Negative for chills, fever, and sweats.  Negative for unexplained weight loss.    HENT:  Negative for headaches and blurry vision.    Cardiovascular:Negative for chest pain or irregular heart beat. Negative for hypertension.    Respiratory:  Negative for cough and shortness of breath.    Gastrointestinal: Negative for abdominal pain, heartburn, melena, nausea, and vomitting.    Genitourinary:  Negative bladder incontinence and dysuria.    Musculoskeletal:  See HPI for details.     Neurological: Negative for numbness.    Psychiatric/Behavioral: Negative for depression.  The patient is not nervous/anxious.      Endocrine: Negative for polyuria    Hematologic/Lymphatic: Negative for bleeding problem.  Does not bruise/bleed easily.    Skin: Negative for poor would healing and rash    Objective:      Physical Examination:    Vital Signs:  There were no vitals filed for this  visit.    Body mass index is 29.44 kg/m².    This a well-developed, well nourished patient in no acute distress.  They are alert and oriented and cooperative to examination.        Patient appears to be stated age.  He is very tender over the lateral malleolus.  No tenderness over the medial malleolus.  No tenderness up at the fibula proximally.  No tenderness over the interosseous membrane  Pertinent New Results:    XRAY Report / Interpretation:   Three views of the left ankle demonstrate an intact mortise with a minimally displaced lateral malleolar fracture    Assessment/Plan:      Lateral malleolar fracture.  This fracture can be treated conservatively without surgical intervention.  We will continue the fracture boot treatment.  He will follow-up in 2 weeks for new x-rays to make sure that the fracture does not displace      This note was created using Dragon voice recognition software that occasionally misinterpreted phrases or words.

## 2022-01-20 ENCOUNTER — OFFICE VISIT (OUTPATIENT)
Dept: ORTHOPEDICS | Facility: CLINIC | Age: 42
End: 2022-01-20
Payer: COMMERCIAL

## 2022-01-20 VITALS — BODY MASS INDEX: 29.51 KG/M2 | WEIGHT: 188 LBS | HEIGHT: 67 IN

## 2022-01-20 DIAGNOSIS — S82.445D CLOSED NONDISPLACED SPIRAL FRACTURE OF SHAFT OF LEFT FIBULA WITH ROUTINE HEALING, SUBSEQUENT ENCOUNTER: Primary | ICD-10-CM

## 2022-01-20 PROCEDURE — 99024 POSTOP FOLLOW-UP VISIT: CPT | Mod: S$GLB,POP,, | Performed by: ORTHOPAEDIC SURGERY

## 2022-01-20 PROCEDURE — 99024 PR POST-OP FOLLOW-UP VISIT: ICD-10-PCS | Mod: S$GLB,POP,, | Performed by: ORTHOPAEDIC SURGERY

## 2022-01-20 NOTE — PROGRESS NOTES
[facility]    Subjective:     Chief Complaint:   Chief Complaint   Patient presents with    Left Lower Leg - Pain     Pt is here for Lt. Fibula Fx f/up, Feels much better, easier to walk.        Past Medical History:   Diagnosis Date    ADD (attention deficit disorder)     Anemia     Alpha Thallsemia    Anxiety     Mild anxiety    Hypertension        No past surgical history on file.    Current Outpatient Medications   Medication Sig    lisinopriL 10 MG tablet Take 1 tablet (10 mg total) by mouth once daily.    oxyCODONE (ROXICODONE) 5 MG immediate release tablet      No current facility-administered medications for this visit.       Review of patient's allergies indicates:  No Known Allergies    Family History   Problem Relation Age of Onset    Alcohol abuse Father     Cancer Father         Cancer in foot - ? Soft tissue or Skin    Hyperlipidemia Mother     Hypertension Mother     Blindness Sister     Mental illness Brother         Drug and alcohol issues    Psychosis Brother        Social History     Socioeconomic History    Marital status:      Spouse name: Melvi De La Garza    Number of children: 2   Occupational History    Occupation: IT support Independant     Comment: [employer] support/[employer]   Tobacco Use    Smoking status: Former Smoker     Packs/day: 0.10     Types: Cigarettes     Start date: 2004     Quit date: 2016     Years since quittin.7    Smokeless tobacco: Never Used   Substance and Sexual Activity    Alcohol use: Yes    Drug use: No    Sexual activity: Yes     Partners: Female   Social History Narrative    Nina was raised by His step father. Biological father  at age 52        B - 5 G 3        Average 1-2 cigarettes per day for 10 years     Social Determinants of Health     Financial Resource Strain: Low Risk     Difficulty of Paying Living Expenses: Not very hard   Food Insecurity: No Food Insecurity    Worried About Running Out of  Food in the Last Year: Never true    Ran Out of Food in the Last Year: Never true   Transportation Needs: No Transportation Needs    Lack of Transportation (Medical): No    Lack of Transportation (Non-Medical): No   Physical Activity: Sufficiently Active    Days of Exercise per Week: 5 days    Minutes of Exercise per Session: 30 min   Stress: Stress Concern Present    Feeling of Stress : To some extent   Social Connections: Unknown    Frequency of Communication with Friends and Family: Once a week    Frequency of Social Gatherings with Friends and Family: Once a week    Active Member of Clubs or Organizations: No    Attends Club or Organization Meetings: Never    Marital Status:    Housing Stability: Low Risk     Unable to Pay for Housing in the Last Year: No    Number of Places Lived in the Last Year: 1    Unstable Housing in the Last Year: No       History of present illness:  Patient comes in today for the left fibula fracture      Review of Systems:    Constitution: Negative for chills, fever, and sweats.  Negative for unexplained weight loss.    HENT:  Negative for headaches and blurry vision.    Cardiovascular:Negative for chest pain or irregular heart beat. Negative for hypertension.    Respiratory:  Negative for cough and shortness of breath.    Gastrointestinal: Negative for abdominal pain, heartburn, melena, nausea, and vomitting.    Genitourinary:  Negative bladder incontinence and dysuria.    Musculoskeletal:  See HPI for details.     Neurological: Negative for numbness.    Psychiatric/Behavioral: Negative for depression.  The patient is not nervous/anxious.      Endocrine: Negative for polyuria    Hematologic/Lymphatic: Negative for bleeding problem.  Does not bruise/bleed easily.    Skin: Negative for poor would healing and rash    Objective:      Physical Examination:    Vital Signs:  There were no vitals filed for this visit.    Body mass index is 29.44 kg/m².    This a  well-developed, well nourished patient in no acute distress.  They are alert and oriented and cooperative to examination.        Patient has full range of motion of the foot.  He is mildly tender over the fibula.  Pertinent New Results:    XRAY Report / Interpretation:   Three views of the ankle demonstrates a fibular fracture with a nondisplaced mortise    Assessment/Plan:      Fibular fracture.  Nondisplaced mortise.  Continue boot.  Follow-up in 3 weeks      This note was created using Dragon voice recognition software that occasionally misinterpreted phrases or words.

## 2022-07-14 ENCOUNTER — OFFICE VISIT (OUTPATIENT)
Dept: FAMILY MEDICINE | Facility: CLINIC | Age: 42
End: 2022-07-14
Payer: COMMERCIAL

## 2022-07-14 VITALS
OXYGEN SATURATION: 99 % | WEIGHT: 190.81 LBS | HEART RATE: 87 BPM | BODY MASS INDEX: 29.95 KG/M2 | DIASTOLIC BLOOD PRESSURE: 86 MMHG | RESPIRATION RATE: 20 BRPM | SYSTOLIC BLOOD PRESSURE: 126 MMHG | HEIGHT: 67 IN

## 2022-07-14 DIAGNOSIS — Z00.00 ANNUAL PHYSICAL EXAM: Primary | ICD-10-CM

## 2022-07-14 DIAGNOSIS — I10 ESSENTIAL HYPERTENSION: Primary | Chronic | ICD-10-CM

## 2022-07-14 DIAGNOSIS — Z11.4 SCREENING FOR HUMAN IMMUNODEFICIENCY VIRUS: ICD-10-CM

## 2022-07-14 PROCEDURE — 99213 OFFICE O/P EST LOW 20 MIN: CPT | Mod: S$GLB,,, | Performed by: INTERNAL MEDICINE

## 2022-07-14 PROCEDURE — 99213 PR OFFICE/OUTPT VISIT, EST, LEVL III, 20-29 MIN: ICD-10-PCS | Mod: S$GLB,,, | Performed by: INTERNAL MEDICINE

## 2022-07-14 RX ORDER — LISINOPRIL 10 MG/1
10 TABLET ORAL DAILY
Qty: 90 TABLET | Refills: 2 | Status: SHIPPED | OUTPATIENT
Start: 2022-07-14 | End: 2022-07-14 | Stop reason: SDUPTHER

## 2022-07-14 RX ORDER — LISINOPRIL 10 MG/1
10 TABLET ORAL DAILY
Qty: 90 TABLET | Refills: 2 | Status: SHIPPED | OUTPATIENT
Start: 2022-07-14 | End: 2023-08-21 | Stop reason: SDUPTHER

## 2022-07-14 NOTE — PROGRESS NOTES
Subjective:       Patient ID: Sanchez De La Garza is a 41 y.o. male.    Chief Complaint: Hypertension    Patient is a 41-year-old gentleman comes for follow-up.  Underlying medical issues include hypertension and overweight status.    Blood pressures seem to be doing okay.  He is watching his diet.    He is concerned about colonoscopy and I have advised him the usual age of colonoscopy start from 47 now.        Hypertension  This is a chronic problem. The current episode started more than 1 year ago. The problem is controlled. Pertinent negatives include no chest pain, headaches, malaise/fatigue, neck pain, orthopnea, palpitations or shortness of breath. Risk factors for coronary artery disease include male gender. Past treatments include ACE inhibitors. The current treatment provides moderate improvement. Compliance problems include psychosocial issues.        Past Medical History:   Diagnosis Date    ADD (attention deficit disorder)     Anemia     Alpha Thallsemia    Anxiety     Mild anxiety    Hypertension      Social History     Socioeconomic History    Marital status:      Spouse name: Melvi De La Garza    Number of children: 2   Occupational History    Occupation: IT support Independant     Comment: IT support/[employer]   Tobacco Use    Smoking status: Former Smoker     Packs/day: 0.10     Types: Cigarettes     Start date: 2004     Quit date: 2016     Years since quittin.2    Smokeless tobacco: Never Used   Substance and Sexual Activity    Alcohol use: Yes    Drug use: No    Sexual activity: Yes     Partners: Female   Social History Narrative    Nina was raised by His step father. Biological father  at age 52        B - 9 G 7        Quit smoking in 2016.      Social Determinants of Health     Financial Resource Strain: Low Risk     Difficulty of Paying Living Expenses: Not hard at all   Food Insecurity: No Food Insecurity    Worried About Running Out of Food in the Last  Year: Never true    Ran Out of Food in the Last Year: Never true   Transportation Needs: No Transportation Needs    Lack of Transportation (Medical): No    Lack of Transportation (Non-Medical): No   Physical Activity: Sufficiently Active    Days of Exercise per Week: 5 days    Minutes of Exercise per Session: 40 min   Stress: No Stress Concern Present    Feeling of Stress : Only a little   Social Connections: Unknown    Frequency of Communication with Friends and Family: Once a week    Frequency of Social Gatherings with Friends and Family: Once a week    Active Member of Clubs or Organizations: No    Attends Club or Organization Meetings: Never    Marital Status:    Housing Stability: Low Risk     Unable to Pay for Housing in the Last Year: No    Number of Places Lived in the Last Year: 1    Unstable Housing in the Last Year: No     History reviewed. No pertinent surgical history.  Family History   Problem Relation Age of Onset    Alcohol abuse Father     Cancer Father         Cancer in foot - ? Soft tissue or Skin    Hyperlipidemia Mother     Hypertension Mother     Blindness Sister     Mental illness Brother         Drug and alcohol issues    Psychosis Brother        Review of Systems   Constitutional: Negative for activity change, diaphoresis, fever, malaise/fatigue and unexpected weight change.   HENT: Negative for hearing loss, rhinorrhea and trouble swallowing.    Eyes: Negative for discharge, redness and visual disturbance.   Respiratory: Negative for chest tightness, shortness of breath and wheezing.    Cardiovascular: Negative for chest pain, palpitations and orthopnea.   Gastrointestinal: Negative for abdominal distention, anal bleeding, blood in stool, constipation, diarrhea and vomiting.   Endocrine: Negative for polydipsia and polyuria.   Genitourinary: Negative for difficulty urinating, hematuria and urgency.   Musculoskeletal: Negative for arthralgias, joint swelling and  "neck pain.   Neurological: Negative for weakness and headaches.   Psychiatric/Behavioral: Negative for confusion and dysphoric mood.         Objective:      Blood pressure 126/86, pulse 87, resp. rate 20, height 5' 7" (1.702 m), weight 86.5 kg (190 lb 12.8 oz), SpO2 99 %. Body mass index is 29.88 kg/m².  Physical Exam  Vitals and nursing note reviewed.   Constitutional:       General: He is not in acute distress.     Appearance: He is well-developed. He is not ill-appearing, toxic-appearing or diaphoretic.      Comments: BMI-29.88   HENT:      Head: Normocephalic and atraumatic.      Mouth/Throat:      Pharynx: No oropharyngeal exudate.   Eyes:      Conjunctiva/sclera: Conjunctivae normal.   Neck:      Thyroid: No thyromegaly.      Vascular: No carotid bruit or JVD.      Trachea: No tracheal deviation.   Cardiovascular:      Rate and Rhythm: Normal rate and regular rhythm.      Heart sounds: Normal heart sounds. No murmur heard.    No friction rub. No gallop.   Pulmonary:      Effort: Pulmonary effort is normal. No respiratory distress.      Breath sounds: Normal breath sounds. No wheezing or rales.   Abdominal:      General: There is no distension.      Palpations: Abdomen is soft.      Tenderness: There is no abdominal tenderness. There is no guarding.   Musculoskeletal:         General: No tenderness or deformity.      Cervical back: Neck supple.      Right lower leg: No edema.      Left lower leg: No edema.   Lymphadenopathy:      Cervical: No cervical adenopathy.   Skin:     General: Skin is warm and dry.      Findings: No lesion or rash.   Neurological:      Mental Status: He is alert. Mental status is at baseline.   Psychiatric:         Attention and Perception: Attention normal.         Behavior: Behavior normal.           Assessment:       1. Essential hypertension    2. Screening for human immunodeficiency virus           No visits with results within 3 Month(s) from this visit.   Latest known visit with " results is:   Lab Visit on 10/07/2021   Component Date Value Ref Range Status    SARS-CoV2 (COVID-19) Qualitative P* 10/07/2021 Not Detected  Not Detected Final    SARS-COV-2- Cycle Number 10/07/2021 N/A   Final         Plan:           Essential hypertension  -     Discontinue: lisinopriL 10 MG tablet; Take 1 tablet (10 mg total) by mouth once daily.  Dispense: 90 tablet; Refill: 2  -     Basic Metabolic Panel; Future; Expected date: 12/12/2022  -     lisinopriL 10 MG tablet; Take 1 tablet (10 mg total) by mouth once daily.  Dispense: 90 tablet; Refill: 2    Screening for human immunodeficiency virus  -     HIV 1/2 Ag/Ab (4th Gen); Future; Expected date: 12/12/2022      Patient's blood pressures generally are doing okay.  He is tending to watch his diet.  He had quit smoking years back.  New prescription will be sent for lisinopril to his pharmacy.    Advised Mr. De La Garza about age and season appropriate immunizations/ cancer screenings.  Also seasonal influenza vaccine, update on tetanus diphtheria vaccination every 10 years.        Patient has been advised to watch diet and exercise. Avoid fried and fatty food. Compliance to medications and follow up urged.    I am ordering him labs for lipid panel, A1c and CBC for his annual physical separately.  Follow-up in 6 months for annual physical or early if needed.  Recommended flu vaccine after September.  He can go to his regular pharmacy or come to our office for the same.      Current Outpatient Medications:     lisinopriL 10 MG tablet, Take 1 tablet (10 mg total) by mouth once daily., Disp: 90 tablet, Rfl: 2

## 2023-03-02 ENCOUNTER — TELEPHONE (OUTPATIENT)
Dept: FAMILY MEDICINE | Facility: CLINIC | Age: 43
End: 2023-03-02

## 2023-04-12 ENCOUNTER — PATIENT MESSAGE (OUTPATIENT)
Dept: FAMILY MEDICINE | Facility: CLINIC | Age: 43
End: 2023-04-12

## 2023-04-12 DIAGNOSIS — Z00.00 ANNUAL PHYSICAL EXAM: Primary | ICD-10-CM

## 2023-04-12 NOTE — TELEPHONE ENCOUNTER
Do you want me to pend any of the labs for this patient?    The following labs have been sent to [facility] which you can do any day fasting in the morning.  They prefer you set up an appointment with the lab online.  At [facility].      ===View-only below this line===      ----- Message -----       From:Sanchez De La Garza       Sent:4/12/2023  3:40 PM CDT         To:Beltran Munoz MD    Subject:Insurance screening codes for yearly    I have attached a form I will bring with me to my yearly on May 4th. It has codes for screenings the insurance covers yearly. I will do any ahead of time that are needed if they can be ordered. Prefer [facility] at 2040 ap if possible but ok with [facility] also. The form attached also says it's ok to monitor chronic conditions and do prescription refills in the exam so if I can get my blood pressure prescription renewed without having to make a separate appointment I would like to do that. Thanks

## 2023-04-27 ENCOUNTER — PATIENT MESSAGE (OUTPATIENT)
Dept: FAMILY MEDICINE | Facility: CLINIC | Age: 43
End: 2023-04-27

## 2023-04-29 LAB
ALBUMIN SERPL-MCNC: 4.5 G/DL (ref 3.6–5.1)
ALBUMIN/GLOB SERPL: 1.5 (CALC) (ref 1–2.5)
ALP SERPL-CCNC: 47 U/L (ref 36–130)
ALT SERPL-CCNC: 28 U/L (ref 9–46)
APPEARANCE UR: CLEAR
AST SERPL-CCNC: 19 U/L (ref 10–40)
BASOPHILS # BLD AUTO: 47 CELLS/UL (ref 0–200)
BASOPHILS NFR BLD AUTO: 0.7 %
BILIRUB SERPL-MCNC: 0.7 MG/DL (ref 0.2–1.2)
BILIRUB UR QL STRIP: NEGATIVE
BUN SERPL-MCNC: 14 MG/DL (ref 7–25)
BUN/CREAT SERPL: NORMAL (CALC) (ref 6–22)
CALCIUM SERPL-MCNC: 9.5 MG/DL (ref 8.6–10.3)
CHLORIDE SERPL-SCNC: 103 MMOL/L (ref 98–110)
CHOLEST SERPL-MCNC: 199 MG/DL
CHOLEST/HDLC SERPL: 4.1 (CALC)
CO2 SERPL-SCNC: 30 MMOL/L (ref 20–32)
COLOR UR: YELLOW
CREAT SERPL-MCNC: 0.91 MG/DL (ref 0.6–1.29)
EGFR: 109 ML/MIN/1.73M2
EOSINOPHIL # BLD AUTO: 80 CELLS/UL (ref 15–500)
EOSINOPHIL NFR BLD AUTO: 1.2 %
ERYTHROCYTE [DISTWIDTH] IN BLOOD BY AUTOMATED COUNT: 17.9 % (ref 11–15)
GLOBULIN SER CALC-MCNC: 3 G/DL (CALC) (ref 1.9–3.7)
GLUCOSE SERPL-MCNC: 97 MG/DL (ref 65–99)
GLUCOSE UR QL STRIP: NEGATIVE
HBA1C MFR BLD: 5.5 % OF TOTAL HGB
HCT VFR BLD AUTO: 44.9 % (ref 38.5–50)
HDLC SERPL-MCNC: 49 MG/DL
HGB BLD-MCNC: 13.6 G/DL (ref 13.2–17.1)
HGB UR QL STRIP: NEGATIVE
KETONES UR QL STRIP: NEGATIVE
LDLC SERPL CALC-MCNC: 125 MG/DL (CALC)
LEUKOCYTE ESTERASE UR QL STRIP: NEGATIVE
LYMPHOCYTES # BLD AUTO: 2787 CELLS/UL (ref 850–3900)
LYMPHOCYTES NFR BLD AUTO: 41.6 %
MCH RBC QN AUTO: 20 PG (ref 27–33)
MCHC RBC AUTO-ENTMCNC: 30.3 G/DL (ref 32–36)
MCV RBC AUTO: 66.1 FL (ref 80–100)
MONOCYTES # BLD AUTO: 570 CELLS/UL (ref 200–950)
MONOCYTES NFR BLD AUTO: 8.5 %
MORPHOLOGY BLD-IMP: NORMAL
NEUTROPHILS # BLD AUTO: 3216 CELLS/UL (ref 1500–7800)
NEUTROPHILS NFR BLD AUTO: 48 %
NITRITE UR QL STRIP: NEGATIVE
NONHDLC SERPL-MCNC: 150 MG/DL (CALC)
PH UR STRIP: 7 [PH] (ref 5–8)
PLATELET # BLD AUTO: 376 THOUSAND/UL (ref 140–400)
PMV BLD REES-ECKER: 10.3 FL (ref 7.5–12.5)
POTASSIUM SERPL-SCNC: 4.3 MMOL/L (ref 3.5–5.3)
PROT SERPL-MCNC: 7.5 G/DL (ref 6.1–8.1)
PROT UR QL STRIP: NEGATIVE
RBC # BLD AUTO: 6.79 MILLION/UL (ref 4.2–5.8)
SODIUM SERPL-SCNC: 139 MMOL/L (ref 135–146)
SP GR UR STRIP: 1.02 (ref 1–1.03)
TRIGL SERPL-MCNC: 134 MG/DL
WBC # BLD AUTO: 6.7 THOUSAND/UL (ref 3.8–10.8)

## 2023-04-30 NOTE — PROGRESS NOTES
Subjective:       Patient ID: Sanchez De La Garza is a 42 y.o. male.    Chief Complaint: Annual Exam     Mr. Sanchez De La Garza is a 42-year-old gentleman who is  here for an annual physical.      Medical issues include the following:-    1.-  essential hypertension,     2.-mild dyslipidemia and     3. Underlying alpha-thalassemia.    He quit smoking few years back with brief period of smoking.  Alcohol use is social.  No substance use or abuse.  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    He is  and has 2 children who are 7 and 9.  He drives safely.  In the normal course of his life and work he is not exposed to environmental pollutants or biological hazards.    He does get a eye checkup and dental checkup regularly.    He does jogging and he has a home gym also.  He may run up to 5 K.  He does get a little winded but now major chest pains or discomfort.  He does play golf and probably when he gets up from sitting position he might feel a little lightheaded.    Generally is happy and content with life with some past stress because of COVID-19 and also a fear of anything going wrong with his body or cancers.    Recent labs have shown somewhat elevated total and LDL cholesterol.  His CBC indices show microcytosis consistent with his alpha thalassemia.  Urinalysis is normal.  General chemistry including blood sugar, kidney tests are normal.    He is updated on Tdap in 2016.  Epic computer picks up that he needs pneumonia vaccine.  (history of smoking and alpha thalassemia)      Past Medical History:   Diagnosis Date    ADD (attention deficit disorder)     Anemia     Alpha Thallsemia    Anxiety     Mild anxiety    Hypertension      Social History     Socioeconomic History    Marital status:      Spouse name: Melvi De La Garza    Number of children: 2   Occupational History    Occupation: IT support Independant     Comment: [employer] support/[employer]   Tobacco Use    Smoking status: Former      Packs/day: 0.10     Types: Cigarettes     Start date: 2004     Quit date: 2016     Years since quittin.0    Smokeless tobacco: Never   Substance and Sexual Activity    Alcohol use: Yes     Alcohol/week: 6.0 standard drinks     Types: 6 Cans of beer per week     Comment: 5-6 beers a month    Drug use: No    Sexual activity: Yes     Partners: Female   Social History Narrative    Nina was raised by His step father. Biological father  at age 52        B - 9 G 7        Quit smoking in 2016.      Social Determinants of Health     Financial Resource Strain: Low Risk     Difficulty of Paying Living Expenses: Not hard at all   Food Insecurity: No Food Insecurity    Worried About Running Out of Food in the Last Year: Never true    Ran Out of Food in the Last Year: Never true   Transportation Needs: No Transportation Needs    Lack of Transportation (Medical): No    Lack of Transportation (Non-Medical): No   Physical Activity: Sufficiently Active    Days of Exercise per Week: 5 days    Minutes of Exercise per Session: 30 min   Stress: No Stress Concern Present    Feeling of Stress : Only a little   Social Connections: Unknown    Frequency of Communication with Friends and Family: Once a week    Frequency of Social Gatherings with Friends and Family: Once a week    Active Member of Clubs or Organizations: No    Attends Club or Organization Meetings: Never    Marital Status:    Housing Stability: Low Risk     Unable to Pay for Housing in the Last Year: No    Number of Places Lived in the Last Year: 1    Unstable Housing in the Last Year: No     History reviewed. No pertinent surgical history.  Family History   Problem Relation Age of Onset    Hyperlipidemia Mother     Hypertension Mother     Alcohol abuse Father     Cancer Father         Cancer in foot - ? Soft tissue or Skin    Blindness Sister     Mental illness Brother         Drug and alcohol issues    Psychosis Brother        Review of Systems  "  Constitutional:  Negative for activity change, diaphoresis, fever and unexpected weight change (Gained 4 lb).   HENT:  Negative for hearing loss, rhinorrhea and trouble swallowing.         He felt a lymph node in his neck and also felt by dentist on the left side.  This was perhaps a month or 2 back.   Eyes:  Negative for discharge, redness and visual disturbance.   Respiratory:  Negative for chest tightness, shortness of breath and wheezing.    Cardiovascular:  Negative for chest pain and palpitations.   Gastrointestinal:  Negative for abdominal distention, anal bleeding, blood in stool, constipation, diarrhea and vomiting.   Endocrine: Negative for polydipsia and polyuria.   Genitourinary:  Negative for difficulty urinating, hematuria and urgency.   Musculoskeletal:  Negative for arthralgias, joint swelling and neck pain.   Skin:         Had a scalp infection about 2 months ago.   Neurological:  Negative for weakness and headaches.   Hematological:         Alpha thalassemia.   Psychiatric/Behavioral:  Negative for confusion and dysphoric mood.        Objective:      Blood pressure 122/85, pulse 63, height 5' 7" (1.702 m), weight 88 kg (194 lb 1.6 oz). Body mass index is 30.4 kg/m².  Physical Exam  Vitals and nursing note reviewed.   Constitutional:       General: He is not in acute distress.     Appearance: He is well-developed. He is not ill-appearing, toxic-appearing or diaphoretic.      Comments: BMI-30.40   HENT:      Head: Normocephalic and atraumatic.      Mouth/Throat:      Pharynx: No oropharyngeal exudate.   Eyes:      Conjunctiva/sclera: Conjunctivae normal.   Neck:      Thyroid: No thyromegaly.      Vascular: No carotid bruit or JVD.      Trachea: No tracheal deviation.   Cardiovascular:      Rate and Rhythm: Normal rate and regular rhythm.      Heart sounds: Normal heart sounds. No murmur heard.    No friction rub. No gallop.   Pulmonary:      Effort: Pulmonary effort is normal. No respiratory " distress.      Breath sounds: Normal breath sounds. No wheezing or rales.   Abdominal:      General: There is no distension.      Palpations: Abdomen is soft.      Tenderness: There is no abdominal tenderness. There is no guarding.   Musculoskeletal:         General: No tenderness or deformity.      Cervical back: Neck supple.      Right lower leg: No edema.      Left lower leg: No edema.   Lymphadenopathy:      Cervical: No cervical adenopathy.   Skin:     General: Skin is warm and dry.      Findings: No lesion or rash.   Neurological:      Mental Status: He is alert. Mental status is at baseline.   Psychiatric:         Attention and Perception: Attention normal.         Behavior: Behavior normal.         Assessment:               Annual physical exam    Pneumococcal vaccine administered  -     (In Office Administered) Pneumococcal Conjugate Vaccine (20 Valent) (IM)       Patient Message on 04/12/2023   Component Date Value Ref Range Status    WBC 04/28/2023 6.7  3.8 - 10.8 Thousand/uL Final    RBC 04/28/2023 6.79 (H)  4.20 - 5.80 Million/uL Final    Hemoglobin 04/28/2023 13.6  13.2 - 17.1 g/dL Final    Hematocrit 04/28/2023 44.9  38.5 - 50.0 % Final    MCV 04/28/2023 66.1 (L)  80.0 - 100.0 fL Final    MCH 04/28/2023 20.0 (L)  27.0 - 33.0 pg Final    MCHC 04/28/2023 30.3 (L)  32.0 - 36.0 g/dL Final    RDW 04/28/2023 17.9 (H)  11.0 - 15.0 % Final    Platelets 04/28/2023 376  140 - 400 Thousand/uL Final    MPV 04/28/2023 10.3  7.5 - 12.5 fL Final    Neutrophils, Abs 04/28/2023 3,216  1,500 - 7,800 cells/uL Final    Lymph # 04/28/2023 2,787  850 - 3,900 cells/uL Final    Mono # 04/28/2023 570  200 - 950 cells/uL Final    Eos # 04/28/2023 80  15 - 500 cells/uL Final    Baso # 04/28/2023 47  0 - 200 cells/uL Final    Neutrophils Relative 04/28/2023 48  % Final    Lymph % 04/28/2023 41.6  % Final    Mono % 04/28/2023 8.5  % Final    Eosinophil % 04/28/2023 1.2  % Final    Basophil % 04/28/2023 0.7  % Final    Glucose  04/28/2023 97  65 - 99 mg/dL Final    BUN 04/28/2023 14  7 - 25 mg/dL Final    Creatinine 04/28/2023 0.91  0.60 - 1.29 mg/dL Final    eGFR 04/28/2023 109  > OR = 60 mL/min/1.73m2 Final    BUN/Creatinine Ratio 04/28/2023 NOT APPLICABLE  6 - 22 (calc) Final    Sodium 04/28/2023 139  135 - 146 mmol/L Final    Potassium 04/28/2023 4.3  3.5 - 5.3 mmol/L Final    Chloride 04/28/2023 103  98 - 110 mmol/L Final    CO2 04/28/2023 30  20 - 32 mmol/L Final    Calcium 04/28/2023 9.5  8.6 - 10.3 mg/dL Final    Total Protein 04/28/2023 7.5  6.1 - 8.1 g/dL Final    Albumin 04/28/2023 4.5  3.6 - 5.1 g/dL Final    Globulin, Total 04/28/2023 3.0  1.9 - 3.7 g/dL (calc) Final    Albumin/Globulin Ratio 04/28/2023 1.5  1.0 - 2.5 (calc) Final    Total Bilirubin 04/28/2023 0.7  0.2 - 1.2 mg/dL Final    Alkaline Phosphatase 04/28/2023 47  36 - 130 U/L Final    AST 04/28/2023 19  10 - 40 U/L Final    ALT 04/28/2023 28  9 - 46 U/L Final    Cholesterol 04/28/2023 199  <200 mg/dL Final    HDL 04/28/2023 49  > OR = 40 mg/dL Final    Triglycerides 04/28/2023 134  <150 mg/dL Final    LDL Cholesterol 04/28/2023 125 (H)  mg/dL (calc) Final    HDL/Cholesterol Ratio 04/28/2023 4.1  <5.0 (calc) Final    Non HDL Chol. (LDL+VLDL) 04/28/2023 150 (H)  <130 mg/dL (calc) Final    Color, UA 04/28/2023 YELLOW  YELLOW Final    Appearance, UA 04/28/2023 CLEAR  CLEAR Final    Specific Gravity, UA 04/28/2023 1.020  1.001 - 1.035 Final    pH, UA 04/28/2023 7.0  5.0 - 8.0 Final    Glucose, UA 04/28/2023 NEGATIVE  NEGATIVE Final    Bilirubin, UA 04/28/2023 NEGATIVE  NEGATIVE Final    Ketones, UA 04/28/2023 NEGATIVE  NEGATIVE Final    Occult Blood UA 04/28/2023 NEGATIVE  NEGATIVE Final    Protein, UA 04/28/2023 NEGATIVE  NEGATIVE Final    Nitrite, UA 04/28/2023 NEGATIVE  NEGATIVE Final    Leukocytes, UA 04/28/2023 NEGATIVE  NEGATIVE Final    Hemoglobin A1C 04/28/2023 5.5  <5.7 % of total Hgb Final    CBC Morphology 04/28/2023   NORMAL Final         Plan:            Annual physical exam    Pneumococcal vaccine administered  -     (In Office Administered) Pneumococcal Conjugate Vaccine (20 Valent) (IM)      Annual physical is good.  He is overweight with BMI of 30.  I have challenged him to lose 5-10 lb of weight when he comes back for follow-up in 6 months time.  If he does lose weight will check his cholesterol to demonstrate that the numbers have come down.      Thankfully he is not diabetic.  He does not smoke at this point.  His blood pressures are generally doing good and if he continues to feel lightheaded and dizzy, we may consider cutting down the blood pressure medication into half or even quitting it.    I will encourage him to continue to watch his diet.  He does love is greens and vegetables and he does not eat too much fatty and fried food.  He does love is cheese however.    Family is doing okay and socially he is well connected with his family members.      He does exercise and perhaps a little more combination of dietary restrictions will have him lose weight.      He is updated on most of the vaccines including tetanus and COVID.      Given his alpha thalassemia and also history of smoking he, he has been recommended pneumococcal or Prevnar 20 vaccine which he will get today.  He may experience a little soreness in the arm very get the vaccine and he can apply some cool compresses.  If he has a mild fever he can take a Tylenol or Motrin.  Generally the effects may not last for 1 or 2 days.      As far as the question of cholesterol medication is concerned, at this point there is no compelling reason given the fact that his blood pressures are under control and he is not diabetic.  He does not smoke also.  There is no strong family history of premature coronary artery disease.    I will continue to encourage him to watch his diet.  Over-the-counter supplements like fish oil capsules, garlic capsules or vitamin supplements like nice seen also help lower the  cholesterol levels.    Follow-up in 6 months time.      Current Outpatient Medications:     lisinopriL 10 MG tablet, Take 1 tablet (10 mg total) by mouth once daily., Disp: 90 tablet, Rfl: 2    multivitamin capsule, Take 1 capsule by mouth once daily., Disp: , Rfl:

## 2023-05-04 ENCOUNTER — OFFICE VISIT (OUTPATIENT)
Dept: FAMILY MEDICINE | Facility: CLINIC | Age: 43
End: 2023-05-04
Payer: COMMERCIAL

## 2023-05-04 VITALS
DIASTOLIC BLOOD PRESSURE: 85 MMHG | HEART RATE: 63 BPM | SYSTOLIC BLOOD PRESSURE: 122 MMHG | WEIGHT: 194.13 LBS | BODY MASS INDEX: 30.47 KG/M2 | HEIGHT: 67 IN

## 2023-05-04 DIAGNOSIS — Z23 PNEUMOCOCCAL VACCINE ADMINISTERED: ICD-10-CM

## 2023-05-04 DIAGNOSIS — Z00.00 ANNUAL PHYSICAL EXAM: Primary | ICD-10-CM

## 2023-05-04 PROCEDURE — 90677 PCV20 VACCINE IM: CPT | Mod: S$GLB,,, | Performed by: INTERNAL MEDICINE

## 2023-05-04 PROCEDURE — 90471 PNEUMOCOCCAL CONJUGATE VACCINE 20-VALENT: ICD-10-PCS | Mod: S$GLB,,, | Performed by: INTERNAL MEDICINE

## 2023-05-04 PROCEDURE — 90677 PNEUMOCOCCAL CONJUGATE VACCINE 20-VALENT: ICD-10-PCS | Mod: S$GLB,,, | Performed by: INTERNAL MEDICINE

## 2023-05-04 PROCEDURE — 99396 PR PREVENTIVE VISIT,EST,40-64: ICD-10-PCS | Mod: S$GLB,,, | Performed by: INTERNAL MEDICINE

## 2023-05-04 PROCEDURE — 99396 PREV VISIT EST AGE 40-64: CPT | Mod: S$GLB,,, | Performed by: INTERNAL MEDICINE

## 2023-05-04 PROCEDURE — 90471 IMMUNIZATION ADMIN: CPT | Mod: S$GLB,,, | Performed by: INTERNAL MEDICINE

## 2023-08-21 ENCOUNTER — PATIENT MESSAGE (OUTPATIENT)
Dept: FAMILY MEDICINE | Facility: CLINIC | Age: 43
End: 2023-08-21

## 2023-08-21 DIAGNOSIS — I10 ESSENTIAL HYPERTENSION: Chronic | ICD-10-CM

## 2023-08-21 RX ORDER — LISINOPRIL 10 MG/1
10 TABLET ORAL DAILY
Qty: 90 TABLET | Refills: 2 | Status: SHIPPED | OUTPATIENT
Start: 2023-08-21

## 2024-05-06 ENCOUNTER — PATIENT MESSAGE (OUTPATIENT)
Dept: FAMILY MEDICINE | Facility: CLINIC | Age: 44
End: 2024-05-06

## 2024-09-24 ENCOUNTER — TELEPHONE (OUTPATIENT)
Dept: FAMILY MEDICINE | Facility: CLINIC | Age: 44
End: 2024-09-24
Payer: COMMERCIAL

## 2024-09-24 DIAGNOSIS — Z00.00 ANNUAL PHYSICAL EXAM: Primary | ICD-10-CM

## 2024-09-24 NOTE — TELEPHONE ENCOUNTER
----- Message from Maria Guadalupe Perez sent at 9/24/2024 11:52 AM CDT -----  Pt would like to receive labs some time this week to the same place pt went last time.   [phone]       The following labs have been sent to [facility] to be done fasting in the morning prior to your follow-up.  You may have to set up an appointment online for the labs.      Dr. Tammy FAIRCHILD

## 2024-10-01 LAB
ALBUMIN SERPL-MCNC: 4.3 G/DL (ref 3.6–5.1)
ALBUMIN/GLOB SERPL: 1.8 (CALC) (ref 1–2.5)
ALP SERPL-CCNC: 54 U/L (ref 36–130)
ALT SERPL-CCNC: 26 U/L (ref 9–46)
AST SERPL-CCNC: 20 U/L (ref 10–40)
BASOPHILS # BLD AUTO: 42 CELLS/UL (ref 0–200)
BASOPHILS NFR BLD AUTO: 0.8 %
BILIRUB SERPL-MCNC: 0.5 MG/DL (ref 0.2–1.2)
BUN SERPL-MCNC: 12 MG/DL (ref 7–25)
BUN/CREAT SERPL: NORMAL (CALC) (ref 6–22)
CALCIUM SERPL-MCNC: 9.3 MG/DL (ref 8.6–10.3)
CHLORIDE SERPL-SCNC: 105 MMOL/L (ref 98–110)
CHOLEST SERPL-MCNC: 161 MG/DL
CHOLEST/HDLC SERPL: 2.9 (CALC)
CO2 SERPL-SCNC: 28 MMOL/L (ref 20–32)
CREAT SERPL-MCNC: 0.77 MG/DL (ref 0.6–1.29)
EGFR: 115 ML/MIN/1.73M2
EOSINOPHIL # BLD AUTO: 88 CELLS/UL (ref 15–500)
EOSINOPHIL NFR BLD AUTO: 1.7 %
ERYTHROCYTE [DISTWIDTH] IN BLOOD BY AUTOMATED COUNT: 18.5 % (ref 11–15)
GLOBULIN SER CALC-MCNC: 2.4 G/DL (CALC) (ref 1.9–3.7)
GLUCOSE SERPL-MCNC: 92 MG/DL (ref 65–99)
HBA1C MFR BLD: 5.7 % OF TOTAL HGB
HCT VFR BLD AUTO: 42.3 % (ref 38.5–50)
HDLC SERPL-MCNC: 55 MG/DL
HGB BLD-MCNC: 12.3 G/DL (ref 13.2–17.1)
LDLC SERPL CALC-MCNC: 94 MG/DL (CALC)
LYMPHOCYTES # BLD AUTO: 2184 CELLS/UL (ref 850–3900)
LYMPHOCYTES NFR BLD AUTO: 42 %
MCH RBC QN AUTO: 19.7 PG (ref 27–33)
MCHC RBC AUTO-ENTMCNC: 29.1 G/DL (ref 32–36)
MCV RBC AUTO: 67.8 FL (ref 80–100)
MONOCYTES # BLD AUTO: 416 CELLS/UL (ref 200–950)
MONOCYTES NFR BLD AUTO: 8 %
NEUTROPHILS # BLD AUTO: 2470 CELLS/UL (ref 1500–7800)
NEUTROPHILS NFR BLD AUTO: 47.5 %
NONHDLC SERPL-MCNC: 106 MG/DL (CALC)
PLATELET # BLD AUTO: 342 THOUSAND/UL (ref 140–400)
PMV BLD REES-ECKER: ABNORMAL FL (ref 7.5–12.5)
POTASSIUM SERPL-SCNC: 4.8 MMOL/L (ref 3.5–5.3)
PROT SERPL-MCNC: 6.7 G/DL (ref 6.1–8.1)
RBC # BLD AUTO: 6.24 MILLION/UL (ref 4.2–5.8)
SERVICE CMNT-IMP: ABNORMAL
SODIUM SERPL-SCNC: 140 MMOL/L (ref 135–146)
TRIGL SERPL-MCNC: 40 MG/DL
WBC # BLD AUTO: 5.2 THOUSAND/UL (ref 3.8–10.8)

## 2024-10-02 ENCOUNTER — TELEPHONE (OUTPATIENT)
Dept: FAMILY MEDICINE | Facility: CLINIC | Age: 44
End: 2024-10-02
Payer: COMMERCIAL

## 2024-10-02 ENCOUNTER — OFFICE VISIT (OUTPATIENT)
Dept: FAMILY MEDICINE | Facility: CLINIC | Age: 44
End: 2024-10-02
Payer: COMMERCIAL

## 2024-10-02 VITALS
DIASTOLIC BLOOD PRESSURE: 80 MMHG | BODY MASS INDEX: 22.91 KG/M2 | SYSTOLIC BLOOD PRESSURE: 124 MMHG | HEIGHT: 67 IN | HEART RATE: 58 BPM | WEIGHT: 146 LBS

## 2024-10-02 DIAGNOSIS — Z00.00 ANNUAL PHYSICAL EXAM: Primary | ICD-10-CM

## 2024-10-02 DIAGNOSIS — Z11.4 SCREENING FOR HUMAN IMMUNODEFICIENCY VIRUS: ICD-10-CM

## 2024-10-02 DIAGNOSIS — Z23 NEED FOR INFLUENZA VACCINATION: ICD-10-CM

## 2024-10-02 PROCEDURE — 1160F RVW MEDS BY RX/DR IN RCRD: CPT | Mod: CPTII,S$GLB,, | Performed by: INTERNAL MEDICINE

## 2024-10-02 PROCEDURE — 3079F DIAST BP 80-89 MM HG: CPT | Mod: CPTII,S$GLB,, | Performed by: INTERNAL MEDICINE

## 2024-10-02 PROCEDURE — 3008F BODY MASS INDEX DOCD: CPT | Mod: CPTII,S$GLB,, | Performed by: INTERNAL MEDICINE

## 2024-10-02 PROCEDURE — 99999 PR PBB SHADOW E&M-EST. PATIENT-LVL III: CPT | Mod: PBBFAC,,, | Performed by: INTERNAL MEDICINE

## 2024-10-02 PROCEDURE — 3044F HG A1C LEVEL LT 7.0%: CPT | Mod: CPTII,S$GLB,, | Performed by: INTERNAL MEDICINE

## 2024-10-02 PROCEDURE — 1159F MED LIST DOCD IN RCRD: CPT | Mod: CPTII,S$GLB,, | Performed by: INTERNAL MEDICINE

## 2024-10-02 PROCEDURE — 90471 IMMUNIZATION ADMIN: CPT | Mod: S$GLB,,, | Performed by: INTERNAL MEDICINE

## 2024-10-02 PROCEDURE — 99396 PREV VISIT EST AGE 40-64: CPT | Mod: 25,S$GLB,, | Performed by: INTERNAL MEDICINE

## 2024-10-02 PROCEDURE — 3074F SYST BP LT 130 MM HG: CPT | Mod: CPTII,S$GLB,, | Performed by: INTERNAL MEDICINE

## 2024-10-02 PROCEDURE — 90656 IIV3 VACC NO PRSV 0.5 ML IM: CPT | Mod: S$GLB,,, | Performed by: INTERNAL MEDICINE

## 2024-10-02 NOTE — TELEPHONE ENCOUNTER
----- Message from Beltran Munoz MD sent at 10/1/2024  6:34 PM CDT -----  Results are somewhat abnormal. Please keep regular follow up.

## 2024-10-02 NOTE — PROGRESS NOTES
Subjective:       Patient ID: Sanchez De La Garza is a 43 y.o. male.    Chief Complaint: Annual Exam     Mr. Sanchez De La Garza is a 43-year-old gentleman who is  here for an annual physical.      Medical issues include the following:-    1.-  history of essential hypertension,   Better with weight loss and off meds    2.-mild dyslipidemia and     3. Underlying alpha-thalassemia. Mild chronic anemia.        Social history:-    1.-  and couple of children    2.-smoking status -he quit smoking several years back    3.- no alcohol use currently no substance use  Or abuse.     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     5.- He drives safely.       6.-In the normal course of his life and work he is not exposed to environmental pollutants or biological hazards.    He does get a eye checkup and dental checkup regularly.        He is updated on Tdap in 2016.  He tends to keep up with any screening measures.      Please note that he had got GLP 1 medications from local pharmacy which helped him significantly with weight loss.  He is not diabetic.          Past Medical History:   Diagnosis Date    ADD (attention deficit disorder)     Anemia     Alpha Thallsemia    Anxiety     Mild anxiety    Hypertension      Social History     Socioeconomic History    Marital status:      Spouse name: Melvi De La Garza    Number of children: 2   Occupational History    Occupation: IT support Independant     Comment: IT support/[employer]   Tobacco Use    Smoking status: Former     Current packs/day: 0.00     Average packs/day: 0.1 packs/day for 12.3 years (1.2 ttl pk-yrs)     Types: Cigarettes     Start date: 2004     Quit date: 2016     Years since quittin.4    Smokeless tobacco: Never   Substance and Sexual Activity    Alcohol use: Yes     Alcohol/week: 2.0 standard drinks of alcohol     Types: 2 Cans of beer per week     Comment: 5-6 beers a month not often anymore.  Cut down drinking.    Drug  use: No    Sexual activity: Yes     Partners: Female     Comment: [employer]   Social History Narrative    Nina was raised by His step father. Biological father  at age 52        B - 11 G 9        Quit smoking in 2016.      Social Drivers of Health     Financial Resource Strain: Low Risk  (2024)    Overall Financial Resource Strain (CARDIA)     Difficulty of Paying Living Expenses: Not hard at all   Food Insecurity: No Food Insecurity (2024)    Hunger Vital Sign     Worried About Running Out of Food in the Last Year: Never true     Ran Out of Food in the Last Year: Never true   Transportation Needs: No Transportation Needs (2023)    PRAPARE - Transportation     Lack of Transportation (Medical): No     Lack of Transportation (Non-Medical): No   Physical Activity: Sufficiently Active (2024)    Exercise Vital Sign     Days of Exercise per Week: 6 days     Minutes of Exercise per Session: 30 min   Stress: No Stress Concern Present (2024)    [employer] - Occupational Stress Questionnaire     Feeling of Stress : Not at all   Housing Stability: Low Risk  (2023)    Housing Stability Vital Sign     Unable to Pay for Housing in the Last Year: No     Number of Places Lived in the Last Year: 1     Unstable Housing in the Last Year: No     History reviewed. No pertinent surgical history.  Family History   Problem Relation Name Age of Onset    Hyperlipidemia Mother Gracie Ruiz     Hypertension Mother Gracie Ruiz     Alcohol abuse Father Jaron Murray     Cancer Father Jaron Murray         Cancer in foot - ? Soft tissue or Skin    No Known Problems Sister Ochoa     Mental illness Brother Ernie         Drug and alcohol issues    Psychosis Brother Ernie     Cancer Maternal Grandmother          Lung Cancer       Review of Systems   Constitutional:  Negative for activity change, chills, fatigue and unexpected weight change (he was lost weight and he was on GLP 1  "for three-month.  He was also watching his diet.).   HENT:  Negative for congestion, hearing loss, rhinorrhea, sore throat and trouble swallowing.         Hearing is adequate.   Eyes:  Negative for pain, discharge and visual disturbance.        No major change in eyesight.   Respiratory:  Negative for cough, chest tightness, shortness of breath and wheezing.    Cardiovascular:  Negative for chest pain and palpitations.   Gastrointestinal:  Negative for abdominal distention, abdominal pain, blood in stool, constipation, diarrhea and vomiting.   Endocrine: Negative for cold intolerance, polydipsia and polyuria.   Genitourinary:  Negative for difficulty urinating, hematuria and urgency.        No prostate issues and incidentally he had self screened and self checked for PSA test and it was 0.8 and within normal range.   Musculoskeletal:  Negative for arthralgias, joint swelling and neck pain.   Skin:  Negative for color change and wound.   Allergic/Immunologic: Negative for environmental allergies and immunocompromised state.   Neurological:  Negative for seizures, weakness and headaches.   Hematological:         He has chronic anemia which is attributed to thalassemia.  About 10 years back he was checked for that.  I need to look into the records again confirm that.   Psychiatric/Behavioral:  Negative for agitation, confusion and dysphoric mood.          Objective:      Blood pressure 124/80, pulse (!) 58, height 5' 7" (1.702 m), weight 66.2 kg (146 lb). Body mass index is 22.87 kg/m².  Physical Exam  Vitals and nursing note reviewed.   Constitutional:       General: He is not in acute distress.     Appearance: He is well-developed. He is not ill-appearing, toxic-appearing or diaphoretic.      Comments: BMI-22.87   HENT:      Head: Normocephalic and atraumatic.      Mouth/Throat:      Pharynx: No oropharyngeal exudate.   Eyes:      Conjunctiva/sclera: Conjunctivae normal.   Neck:      Thyroid: No thyromegaly.      " Vascular: No carotid bruit or JVD.      Trachea: No tracheal deviation.      Comments: Today I could not feel any lymph nodes in the neck though he had experienced in past.  Cardiovascular:      Rate and Rhythm: Normal rate and regular rhythm.      Heart sounds: Normal heart sounds. No murmur heard.     No friction rub. No gallop.      Comments: Heart sounds are normal.  Pulmonary:      Effort: Pulmonary effort is normal. No respiratory distress.      Breath sounds: Normal breath sounds. No wheezing or rales.      Comments: Lungs are clear to auscultation.  Abdominal:      General: There is no distension.      Palpations: Abdomen is soft.      Tenderness: There is no abdominal tenderness. There is no guarding.   Musculoskeletal:         General: No tenderness or deformity.      Cervical back: Neck supple.      Right lower leg: No edema.      Left lower leg: No edema.   Lymphadenopathy:      Cervical: No cervical adenopathy.   Skin:     General: Skin is warm and dry.      Findings: No lesion or rash.   Neurological:      Mental Status: He is alert. Mental status is at baseline.   Psychiatric:         Attention and Perception: Attention normal.         Mood and Affect: Mood is anxious.         Behavior: Behavior normal.      Comments:   Generally displays some chronic sense of anxiety and fear of any medical issues or diagnosis.           Assessment:       Telephone on 09/24/2024   Component Date Value Ref Range Status    Hemoglobin A1C 09/30/2024 5.7 (H)  <5.7 % of total Hgb Final    Cholesterol 09/30/2024 161  <200 mg/dL Final    HDL 09/30/2024 55  > OR = 40 mg/dL Final    Triglycerides 09/30/2024 40  <150 mg/dL Final    LDL Cholesterol 09/30/2024 94  mg/dL (calc) Final    HDL/Cholesterol Ratio 09/30/2024 2.9  <5.0 (calc) Final    Non HDL Chol. (LDL+VLDL) 09/30/2024 106  <130 mg/dL (calc) Final    WBC 09/30/2024 5.2  3.8 - 10.8 Thousand/uL Final    RBC 09/30/2024 6.24 (H)  4.20 - 5.80 Million/uL Final    Hemoglobin  09/30/2024 12.3 (L)  13.2 - 17.1 g/dL Final    Hematocrit 09/30/2024 42.3  38.5 - 50.0 % Final    MCV 09/30/2024 67.8 (L)  80.0 - 100.0 fL Final    MCH 09/30/2024 19.7 (L)  27.0 - 33.0 pg Final    MCHC 09/30/2024 29.1 (L)  32.0 - 36.0 g/dL Final    RDW 09/30/2024 18.5 (H)  11.0 - 15.0 % Final    Platelets 09/30/2024 342  140 - 400 Thousand/uL Final    MPV 09/30/2024 SEE COMMENT  7.5 - 12.5 fL Final    Neutrophils, Abs 09/30/2024 2,470  1,500 - 7,800 cells/uL Final    Lymph # 09/30/2024 2,184  850 - 3,900 cells/uL Final    Mono # 09/30/2024 416  200 - 950 cells/uL Final    Eos # 09/30/2024 88  15 - 500 cells/uL Final    Baso # 09/30/2024 42  0 - 200 cells/uL Final    Neutrophils Relative 09/30/2024 47.5  % Final    Lymph % 09/30/2024 42.0  % Final    Mono % 09/30/2024 8.0  % Final    Eosinophil % 09/30/2024 1.7  % Final    Basophil % 09/30/2024 0.8  % Final    Differential Comment 09/30/2024 Review of peripheral smear confirms automated results.   Final    Glucose 09/30/2024 92  65 - 99 mg/dL Final    BUN 09/30/2024 12  7 - 25 mg/dL Final    Creatinine 09/30/2024 0.77  0.60 - 1.29 mg/dL Final    eGFR 09/30/2024 115  > OR = 60 mL/min/1.73m2 Final    BUN/Creatinine Ratio 09/30/2024 SEE NOTE:  6 - 22 (calc) Final    Sodium 09/30/2024 140  135 - 146 mmol/L Final    Potassium 09/30/2024 4.8  3.5 - 5.3 mmol/L Final    Chloride 09/30/2024 105  98 - 110 mmol/L Final    CO2 09/30/2024 28  20 - 32 mmol/L Final    Calcium 09/30/2024 9.3  8.6 - 10.3 mg/dL Final    Total Protein 09/30/2024 6.7  6.1 - 8.1 g/dL Final    Albumin 09/30/2024 4.3  3.6 - 5.1 g/dL Final    Globulin, Total 09/30/2024 2.4  1.9 - 3.7 g/dL (calc) Final    Albumin/Globulin Ratio 09/30/2024 1.8  1.0 - 2.5 (calc) Final    Total Bilirubin 09/30/2024 0.5  0.2 - 1.2 mg/dL Final    Alkaline Phosphatase 09/30/2024 54  36 - 130 U/L Final    AST 09/30/2024 20  10 - 40 U/L Final    ALT 09/30/2024 26  9 - 46 U/L Final   2 Result Notes       1 Patient Communication        1 Follow-up Encounter       1 HM Topic             Component Ref Range & Units 2 d ago 1 yr ago 3 yr ago 4 yr ago 5 yr ago 6 yr ago 8 yr ago   Cholesterol <200 mg/dL 161 199 167 R 176 R 226 High  R 172 R 186 R, CM   HDL > OR = 40 mg/dL 55 49 40 R 45 R 54 R 45 R 52 R, CM   Triglycerides <150 mg/dL 40 134 86 R 84 R 80 R 101 R 120 R, CM   LDL Cholesterol mg/dL (calc) 94 125 High  CM     110.0 R,                     WBC 3.8 - 10.8 Thousand/uL 5.2 6.7 6.7 R 10.9 High  R 5.30 R 8.23 R     RBC 4.20 - 5.80 Million/uL 6.24 High  6.79 High  6.89 High  R 7.02 High Panic  R, CM 6.23 High  R 6.74 High  R     Hemoglobin 13.2 - 17.1 g/dL 12.3 Low  13.6 12.9 Low  R 14.1 R 12.6 Low  R 13.9 Low  R 13.0 Low  R    Hematocrit 38.5 - 50.0 % 42.3 44.9 44.5 R 45.8 R 40.7 R 42.3 R  43.7 R   MCV 80.0 - 100.0 fL 67.8 Low  66.1 Low  65 Low  R 65 Low  R 65 Low  R 63 Low  R     MCH 27.0 - 33.0 pg 19.7 Low  20.0 Low  18.7 Low  R 20.1 Low  R 20.2 Low  R 20.6 Low  R     MCHC 32.0 - 36.0 g/dL 29.1 Low  30.3 Low  29.0 Low  R 30.8 Low  R 31.0 Low  R 32.9 R        1 Patient Communication        Component Ref Range & Units 2 d ago 1 yr ago   Hemoglobin A1C <5.7 % of total Hgb 5.7 High  5.5 CM            1. Annual physical exam    2. Screening for human immunodeficiency virus    3. Need for influenza vaccination             Plan:   Annual physical exam    Screening for human immunodeficiency virus    Need for influenza vaccination    Annual physical issues have been reviewed.      His A1c level is 5.7 which is at the borderline.  We did discuss about diets and he agrees to cut down on the carbohydrates.  Thus far he was not sure about carbohydrates and sugars contribution to diabetes.    His lipid panel has improved significantly as compared to last time.  He was on GLP 1 agonist from local spa/clinic which helped him lose weight.    I have encouraged him to continue with walking and exercise and incorporate more fruits and vegetables in his diet and  less of fried fatty food and starchy food.    Today he got the flu shot.      His last Tdap vaccination was on 06/28/2016 and the next 1 will be due in 2026.    After the age of 47 he will be eligible for colorectal screening.      Continue with the COVID precautions.      He can consider taking the update booster dose on COVID vaccine perhaps after 7 days or so since he got the flu shot today as I personally like to avoid 2 vaccinations on the same day.    He does not smoke cigarettes.      He has cut down on alcohol use also.    He is in a stable monogamous relationship with his wife and his children are doing okay.      Family history details are not very clear but father did have some mental issues.    If all goes well I will see him back in 1 year time.  He should get a regular eye checkup and dental checkup also.    Follow up in about 1 year (around 10/2/2025).      Current Outpatient Medications:     multivitamin capsule, Take 1 capsule by mouth once daily., Disp: , Rfl:     Beltran Munoz

## 2024-11-11 ENCOUNTER — OFFICE VISIT (OUTPATIENT)
Dept: FAMILY MEDICINE | Facility: CLINIC | Age: 44
End: 2024-11-11
Payer: COMMERCIAL

## 2024-11-11 ENCOUNTER — HOSPITAL ENCOUNTER (OUTPATIENT)
Dept: RADIOLOGY | Facility: HOSPITAL | Age: 44
Discharge: HOME | End: 2024-11-11
Attending: INTERNAL MEDICINE
Payer: COMMERCIAL

## 2024-11-11 VITALS
HEIGHT: 67 IN | BODY MASS INDEX: 23.86 KG/M2 | WEIGHT: 152 LBS | SYSTOLIC BLOOD PRESSURE: 139 MMHG | DIASTOLIC BLOOD PRESSURE: 80 MMHG

## 2024-11-11 DIAGNOSIS — R07.9 RIGHT-SIDED CHEST PAIN: Primary | ICD-10-CM

## 2024-11-11 DIAGNOSIS — S29.011A MUSCLE STRAIN OF CHEST WALL, INITIAL ENCOUNTER: ICD-10-CM

## 2024-11-11 DIAGNOSIS — R07.9 RIGHT-SIDED CHEST PAIN: ICD-10-CM

## 2024-11-11 PROCEDURE — 99999 PR PBB SHADOW E&M-EST. PATIENT-LVL III: CPT | Mod: PBBFAC,,, | Performed by: INTERNAL MEDICINE

## 2024-11-11 PROCEDURE — 3044F HG A1C LEVEL LT 7.0%: CPT | Mod: CPTII,S$GLB,, | Performed by: INTERNAL MEDICINE

## 2024-11-11 PROCEDURE — 1159F MED LIST DOCD IN RCRD: CPT | Mod: CPTII,S$GLB,, | Performed by: INTERNAL MEDICINE

## 2024-11-11 PROCEDURE — 3075F SYST BP GE 130 - 139MM HG: CPT | Mod: CPTII,S$GLB,, | Performed by: INTERNAL MEDICINE

## 2024-11-11 PROCEDURE — 71046 X-RAY EXAM CHEST 2 VIEWS: CPT | Mod: TC,PO

## 2024-11-11 PROCEDURE — 1160F RVW MEDS BY RX/DR IN RCRD: CPT | Mod: CPTII,S$GLB,, | Performed by: INTERNAL MEDICINE

## 2024-11-11 PROCEDURE — 71046 X-RAY EXAM CHEST 2 VIEWS: CPT | Mod: 26,,, | Performed by: RADIOLOGY

## 2024-11-11 PROCEDURE — 99213 OFFICE O/P EST LOW 20 MIN: CPT | Mod: S$GLB,,, | Performed by: INTERNAL MEDICINE

## 2024-11-11 PROCEDURE — 3008F BODY MASS INDEX DOCD: CPT | Mod: CPTII,S$GLB,, | Performed by: INTERNAL MEDICINE

## 2024-11-11 PROCEDURE — 3079F DIAST BP 80-89 MM HG: CPT | Mod: CPTII,S$GLB,, | Performed by: INTERNAL MEDICINE

## 2024-11-11 NOTE — PROGRESS NOTES
Subjective:       Patient ID: Sanchez De La Garza is a 43 y.o. male.    Chief Complaint: Chest Pain, Anxiety, and Hypertension    History of Present Illness    CHIEF COMPLAINT:  Sanchez presents with right-sided chest and back pain persisting for 5-6 weeks, causing anxiety about potential underlying causes.    HPI:  Sanchez reports right chest and back pain for 5-6 weeks, coinciding with his yearly check-up. The pain onset is attributed to pulling on a trampoline during hurricane preparation. It is persistent but not severe, exacerbated by leaning forward and deep breathing. 5 days ago, a sharp pain occurred while playing soccer with his child, triggered by sudden movement.    The pain is worse in the mornings upon waking but improves throughout the day. It is noticeable during forced deep breathing or when putting on tight shoes. The pain does not interfere with sleep or occur at night. Sanchez has continued regular activities, including jogging 3-4 miles daily without pain during the activity.    Initially worse, the pain has improved over time but not fully resolved. The discomfort is described as tolerable, and the patient has not required pain medication. The pain is not related to eating or consumption of fatty foods.    Sanchez's anxiety about the pain's persistence, rather than its severity, prompted the medical visit. He is concerned about potential serious underlying causes due to the pain's duration.    Sanchez denies fever, chills, rash, weight loss, indigestion, or any digestive issues. There is no family history of gallbladder problems.    MEDICAL HISTORY:  Sanchez received the COVID-19 vaccine two weeks ago at [facility].    IMAGING:  Sanchez underwent a chest XR, and the results are currently pending.      ROS:  General: -fever, -chills, -weight loss  Musculoskeletal: +back pain-right-sided chest pain  Skin: -rash  Psychiatric: +anxiety         Past Medical History:   Diagnosis Date    ADD (attention  deficit disorder)     Anemia     Alpha Thallsemia    Anxiety     Mild anxiety    Hypertension      Social History     Socioeconomic History    Marital status:      Spouse name: Melvi De La Garza    Number of children: 2   Occupational History    Occupation: IT support Independant     Comment: IT support/[employer]   Tobacco Use    Smoking status: Former     Current packs/day: 0.00     Average packs/day: 0.1 packs/day for 12.3 years (1.2 ttl pk-yrs)     Types: Cigarettes     Start date: 2004     Quit date: 2016     Years since quittin.5    Smokeless tobacco: Never   Substance and Sexual Activity    Alcohol use: Yes     Alcohol/week: 2.0 standard drinks of alcohol     Types: 2 Cans of beer per week     Comment: 5-6 beers a month not often anymore.  Cut down drinking.    Drug use: No    Sexual activity: Yes     Partners: Female     Comment: [employer]   Social History Narrative    Nina was raised by His step father. Biological father  at age 52        B - 11 G 9        Quit smoking in 2016.      Social Drivers of Health     Financial Resource Strain: Low Risk  (2024)    Overall Financial Resource Strain (CARDIA)     Difficulty of Paying Living Expenses: Not hard at all   Food Insecurity: No Food Insecurity (2024)    Hunger Vital Sign     Worried About Running Out of Food in the Last Year: Never true     Ran Out of Food in the Last Year: Never true   Transportation Needs: No Transportation Needs (2023)    PRAPARE - Transportation     Lack of Transportation (Medical): No     Lack of Transportation (Non-Medical): No   Physical Activity: Sufficiently Active (2024)    Exercise Vital Sign     Days of Exercise per Week: 6 days     Minutes of Exercise per Session: 30 min   Stress: No Stress Concern Present (2024)    [employer] - Occupational Stress Questionnaire     Feeling of Stress : Not at all   Housing Stability: Low Risk  (2023)     "Housing Stability Vital Sign     Unable to Pay for Housing in the Last Year: No     Number of Places Lived in the Last Year: 1     Unstable Housing in the Last Year: No     History reviewed. No pertinent surgical history.  Family History   Problem Relation Name Age of Onset    Hyperlipidemia Mother Gracie Ruiz     Hypertension Mother Gracie Ruiz     Alcohol abuse Father Jaron Murray     Cancer Father Jaron Murray         Cancer in foot - ? Soft tissue or Skin    No Known Problems Sister Ochoa     Mental illness Brother Ernie         Drug and alcohol issues    Psychosis Brother Ernie     Cancer Maternal Grandmother          Lung Cancer       Objective:      Physical Exam  Blood pressure 139/80, pulse (P) 80, height 5' 7" (1.702 m), weight 68.9 kg (152 lb). Body mass index is 23.81 kg/m².  Physical Exam    General: No acute distress. Well-developed. Well-nourished.  Eyes: EOMI. Sclerae anicteric.  HENT: Normocephalic. Atraumatic. Nares patent. Moist oral mucosa.    Cardiovascular: Regular rate. Regular rhythm. No murmurs. No rubs. No gallops. Normal S1, S2.  Respiratory: Normal respiratory effort. Clear to auscultation bilaterally. No rales. No rhonchi. No wheezing.  Abdomen: Soft. Non-tender. Non-distended. Normoactive bowel sounds. Mcallister's sign negative. No abdominal rebound.  Musculoskeletal: No  obvious deformity. Some discomfort towards the scapular region.  Extremities: No lower extremity edema.  Neurological: Alert & oriented  No slurred speech. Normal gait.  Sesser test negative.  Compression of the vertex did not reproduce any tenderness on the chest wall or the neck or back  Psychiatric:  Slightly anxious affect. Good insight. Good judgment.  Skin: Warm. Dry. No rash.              Assessment:       Details    Reading Physician Reading Date Result Priority   Michael Brown MD  [phone] 11/11/2024 Routine     Narrative & Impression  EXAMINATION:  XR CHEST PA AND LATERAL     CLINICAL " HISTORY:  Chest pain, unspecified     COMPARISON:  None available     FINDINGS:  Cardiomediastinal silhouette is within normal limits. There is no confluent airspace disease. There is no pleural effusion or pneumothorax. No acute osseous abnormality.     Impression:     No acute pulmonary process.        Electronically signed by:Michael Brown  Date:                                            11/11/2024  Time:                                           16:21        Exam Ended: 11/11/24 16:14 CST Last Resulted: 11/11/24 16:21 CST       Assessment & Plan    Assessed patient's right chest and back pain, likely due to chest wall strain from trampoline-related exertion  Considered and ruled out shingles due to absence of rash and duration of symptoms  Low suspicion for gallbladder issues given lack of symptoms with fatty food consumption  Ordered chest XR to rule out pleurisy, rib fracture, or spinal issues  Will reassess need for further imaging (e.g., spine MRI) if symptoms persist or worsen    R07.89 OTHER CHEST PAIN:  - Explained that symptoms are likely due to muscle strain, but further evaluation needed to rule out other causes.  - Discussed low likelihood of serious conditions based on symptoms and exam.  - Chest XR ordered to evaluate right chest pain.  - Contact the office if pain worsens or persists for further evaluation.    M54.9 DORSALGIA, UNSPECIFIED:  - Explained that symptoms are likely due to muscle strain, but further evaluation needed to rule out other causes.  - Discussed low likelihood of serious conditions based on symptoms and exam.  - Chest XR ordered to evaluate back pain.  - Contact the office if pain worsens or persists for further evaluation.    Z71.1 PERSON WITH FEARED HEALTH COMPLAINT IN WHOM NO DIAGNOSIS IS MADE:  - Discussed low likelihood of serious conditions such as shingles or gallbladder issues based on symptoms and exam.    LIFESTYLE CHANGES:  - Sanchez to perform stretching  exercises.  - Sanchez to rest the affected area.  - Follow up on October 7, 2025 as scheduled for annual physical appointment.         Plan:   Right-sided chest pain  -     X-Ray Chest PA And Lateral; Future; Expected date: 11/11/2024    Muscle strain of chest wall, initial encounter  Comments:  Most likely a chest wall strain and hopefully little rest and stretch exercises will help.  Less likely spinal referred pain      Patient's medical conditions has been reviewed.      The new pains started after he was trying to pull a trampoline and did some exertional activity.    While the pain is settling down, he seems to be worried about any potential adverse diagnosis or consequences of this incompletely resolved pain    He did not have any rash suggestive of shingles.      He does not have any biliary colic or gallbladder like symptoms and no family history of gallbladder disease.    On examination, it seems that it might be musculoskeletal referred pain or probably a chest wall strain    Will get a chest x-ray which might show some views of spine and see if there is any problem there and unlikely to be rib fracture.  Very less likely to be pleurisy since there was no cough or congestion but chest x-ray will remove the doubt.  After the chest x-ray is done will see how he does in the next several weeks or so and if the pain continues to persist or get worse will go for a deep dive at that point.      He will keep his regular appointment otherwise next year on October 7, 2025 for annual physical.  Follow up in about 11 months (around 10/7/2025), or if symptoms worsen or fail to improve, for Preventive Physical.      Current Outpatient Medications:     multivitamin capsule, Take 1 capsule by mouth once daily., Disp: , Rfl:     This note was generated with the assistance of ambient listening technology. Verbal consent was obtained by the patient and accompanying visitor(s) for the recording of patient appointment to  facilitate this note. I attest to having reviewed and edited the generated note for accuracy, though some syntax or spelling errors may persist. Please contact the author of this note for any clarification.    The chest x-ray has been reviewed by me.  Official report does not show any abnormality.  This was communicated to the patient.  On PA view, allowing for any positional changes, it does show some curvature in the spine which could lead to some DJD and referred pain also.  Will address this issue at follow-up.  Message sent to the patient also.  Beltran Munoz        Answers submitted by the patient for this visit:  Back Pain Questionnaire (Submitted on 11/11/2024)  Chief Complaint: Back pain  Chronicity: new  Onset: 1 to 4 weeks ago  Frequency: intermittently  Progression since onset: gradually improving  Pain location: thoracic spine, costovertebral angle  Pain quality: aching, burning  Radiates to: does not radiate  Pain - numeric: 4/10  Pain is: worse during the day  Aggravated by: bending, coughing, position, twisting  Stiffness is present: in the morning  abdominal pain: No  bladder incontinence: No  bowel incontinence: No  chest pain: Yes  dysuria: No  fever: No  headaches: No  leg pain: No  numbness: No  paresis: No  paresthesias: No  pelvic pain: No  perianal numbness: No  tingling: No  weakness: No  weight loss: No  genital pain: No  hematuria: No  Risk factors: recent trauma  Pain severity: mild

## 2025-01-09 ENCOUNTER — OFFICE VISIT (OUTPATIENT)
Dept: PODIATRY | Facility: CLINIC | Age: 45
End: 2025-01-09
Payer: COMMERCIAL

## 2025-01-09 ENCOUNTER — HOSPITAL ENCOUNTER (OUTPATIENT)
Dept: RADIOLOGY | Facility: CLINIC | Age: 45
Discharge: HOME | End: 2025-01-09
Payer: COMMERCIAL

## 2025-01-09 VITALS — WEIGHT: 151.69 LBS | BODY MASS INDEX: 23.76 KG/M2

## 2025-01-09 DIAGNOSIS — R22.42 FOOT MASS, LEFT: ICD-10-CM

## 2025-01-09 DIAGNOSIS — M67.472 GANGLION CYST OF LEFT FOOT: Primary | ICD-10-CM

## 2025-01-09 PROCEDURE — 73630 X-RAY EXAM OF FOOT: CPT | Mod: LT,S$GLB,, | Performed by: RADIOLOGY

## 2025-01-09 PROCEDURE — 1160F RVW MEDS BY RX/DR IN RCRD: CPT | Mod: CPTII,S$GLB,,

## 2025-01-09 PROCEDURE — 20612 ASPIRATE/INJ GANGLION CYST: CPT | Mod: S$GLB,,,

## 2025-01-09 PROCEDURE — 99203 OFFICE O/P NEW LOW 30 MIN: CPT | Mod: 25,S$GLB,,

## 2025-01-09 PROCEDURE — 3008F BODY MASS INDEX DOCD: CPT | Mod: CPTII,S$GLB,,

## 2025-01-09 PROCEDURE — 1159F MED LIST DOCD IN RCRD: CPT | Mod: CPTII,S$GLB,,

## 2025-01-09 PROCEDURE — 99999 PR PBB SHADOW E&M-EST. PATIENT-LVL III: CPT | Mod: PBBFAC,,,

## 2025-01-09 NOTE — PROGRESS NOTES
[address] [facility] [address]  [place] [address]  Phone: [phone]   [phone]    Patient's PCP:Beltarn Munoz MD  Referring Provider: Aaareferral Self    Subjective:      Chief Complaint: Left Foot mass   HPI  Sanchez De La Garza is a 44 y.o. male who presents today with a complaint left foot mass on the medial and proximal aspect of the left 1st MTPJ. The current episode started 3-4 months ago. There are no significant symptoms, patient is concerned for malignancy. Probable cause of complaint unknown, denies any recent foot injury or trauma.  The mass slowly increased in size and plateau 'ed. Treatment to date have included none which provided no relief.     Vitals:    01/09/25 1451   Weight: 68.8 kg (151 lb 10.8 oz)   PainSc: 0-No pain      Shoe Size: 8.5    History reviewed. No pertinent surgical history.  Past Medical History:   Diagnosis Date    ADD (attention deficit disorder)     Anemia     Alpha Thallsemia    Anxiety     Mild anxiety    Hypertension      Family History   Problem Relation Name Age of Onset    Hyperlipidemia Mother Gracie Ruiz     Hypertension Mother Gracie Ruiz     Alcohol abuse Father Jaron Murray     Cancer Father Jaron Murray         Cancer in foot - ? Soft tissue or Skin    No Known Problems Sister Ochoa     Mental illness Brother Ernie         Drug and alcohol issues    Psychosis Brother Ernie     Cancer Maternal Grandmother          Lung Cancer        Social History:   Marital Status:   Alcohol History:  reports current alcohol use of about 2.0 standard drinks of alcohol per week.  Tobacco History:  reports that he quit smoking about 8 years ago. His smoking use included cigarettes. He started smoking about 21 years ago. He has a 1.2 pack-year smoking history. He has never used smokeless tobacco.  Drug History:  reports no history of drug use.    Review of patient's allergies indicates:  No Known Allergies    Current Outpatient Medications    Medication Sig Dispense Refill    multivitamin capsule Take 1 capsule by mouth once daily.       No current facility-administered medications for this visit.       Review of Systems   Constitutional:  Negative for chills, fatigue, fever and unexpected weight change.   HENT:  Negative for hearing loss and trouble swallowing.    Eyes:  Negative for photophobia and visual disturbance.   Respiratory:  Negative for cough, shortness of breath and wheezing.    Cardiovascular:  Negative for chest pain, palpitations and leg swelling.   Gastrointestinal:  Negative for abdominal pain and nausea.   Genitourinary:  Negative for dysuria and frequency.   Musculoskeletal:  Negative for arthralgias, back pain, gait problem, joint swelling, myalgias and neck pain.   Skin:  Negative for rash and wound.   Neurological:  Negative for tremors, seizures, weakness, numbness and headaches.   Hematological:  Does not bruise/bleed easily.   Psychiatric/Behavioral:  Negative for hallucinations.          Objective:        Physical Exam:   Foot Exam    Left Foot/Ankle      Inspection and Palpation  Ecchymosis: none  Tenderness: none   Swelling: none   Arch: normal  Hammertoes: absent  Claw toes: absent  Hallux valgus: no  Hallux limitus: no  Skin Exam: skin intact; no blister, no cellulitis, no ulcer and no erythema   Fungus Toenails: not present    Neurovascular  Dorsalis pedis: 2+  Posterior tibial: 2+  Capillary refill: 3+  Varicose veins: not present  Saphenous nerve sensation: normal  Tibial nerve sensation: normal  Superficial peroneal nerve sensation: normal  Deep peroneal nerve sensation: normal  Sural nerve sensation: normal    Muscle Strength  Ankle dorsiflexion: 5  Ankle plantar flexion: 5  Ankle inversion: 5  Ankle eversion: 5  Great toe extension: 5  Great toe flexion: 5    Range of Motion    Normal left ankle ROM    Comments  Mobile hard cyst proximal and medial to the 1st MTPJ     Imaging:     EXAMINATION:  XR FOOT COMPLETE 3  VIEW LEFT     CLINICAL HISTORY:  .  Pain in left foot     TECHNIQUE:  AP, lateral and oblique views of the left foot were performed.     COMPARISON:  None     FINDINGS:  A fracture of the bones of the left foot is not seen. Soft tissue swelling or foreign bodies are not noted. Juxta-articular bone erosion or Osteoporosis is not seen.     Impression:     Negative radiographs of the left foot.        Electronically signed by:Jose Kowalski MD  Date:                                            01/09/2025  Time:                                           14:57         Assessment:       1. Ganglion cyst of left foot    2. Foot mass, left      Plan:   Ganglion cyst of left foot  -     Ganglion Cyst    Foot mass, left  -     X-Ray Foot Complete Left      Counseling: I provided patient education verbally regarding: Patient diagnosis, treatment options, as well as alternatives, risks, and benefits.     Left Foot Xray, confirming non bony ST mass    Cyst aspiration, clear viscous fluid consistent with ganglion cyst off the 1st MTPJ or adjacent tendon sheath. Assured patient non malignant outpouching of joint or tendon sheath.  Discussed treatment options. Patient would like to compress after aspiration and see if it comes back which we discussed a high likely perez of. May consider steroid injection if returns, not interested in surgery as asymptomatic.     Follow up prn     This note was created using Dragon voice recognition software that occasionally misinterpreted phrases or words.     Ganglion Cyst    Date/Time: 1/9/2025 3:00 PM    Performed by: Markie Ma DPM  Authorized by: Markie Ma DPM    Consent Done?:  Yes (Verbal) and Yes (Written)  Indications:  Diagnostic evaluation  Timeout: prior to procedure the correct patient, procedure, and site was verified    Prep: patient was prepped and draped in usual sterile fashion      Local anesthesia used?: No    Location:  Foot  Ultrasonic Guidance for Needle  Placement?: No    Needle size:  18 G  Approach:  Dorsal  Aspirate amount (ml):  2  Aspirate:  Clear    Additional Comments: Clear Vicous fluid consistent with ganglion cyst.   Site:  Left foot          Markie Ma DPM  Podiatry / Foot and Ankle Surgery   Secure chat preferred